# Patient Record
Sex: MALE | Race: WHITE | NOT HISPANIC OR LATINO | URBAN - METROPOLITAN AREA
[De-identification: names, ages, dates, MRNs, and addresses within clinical notes are randomized per-mention and may not be internally consistent; named-entity substitution may affect disease eponyms.]

---

## 2020-06-22 ENCOUNTER — INPATIENT (INPATIENT)
Facility: HOSPITAL | Age: 65
LOS: 7 days | Discharge: HOME HEALTH | DRG: 329 | End: 2020-06-30
Attending: HOSPITALIST | Admitting: HOSPITALIST
Payer: COMMERCIAL

## 2020-06-22 VITALS — HEIGHT: 70 IN | WEIGHT: 199.96 LBS

## 2020-06-22 DIAGNOSIS — R55 SYNCOPE AND COLLAPSE: ICD-10-CM

## 2020-06-22 LAB
ALBUMIN SERPL ELPH-MCNC: 3.2 G/DL — LOW (ref 3.3–5)
ALP SERPL-CCNC: 89 U/L — SIGNIFICANT CHANGE UP (ref 40–120)
ALT FLD-CCNC: 44 U/L — SIGNIFICANT CHANGE UP (ref 12–78)
ANION GAP SERPL CALC-SCNC: 3 MMOL/L — LOW (ref 5–17)
APPEARANCE UR: CLEAR — SIGNIFICANT CHANGE UP
APTT BLD: 24.5 SEC — LOW (ref 27.5–36.3)
AST SERPL-CCNC: 87 U/L — HIGH (ref 15–37)
BASOPHILS # BLD AUTO: 0 K/UL — SIGNIFICANT CHANGE UP (ref 0–0.2)
BASOPHILS NFR BLD AUTO: 0 % — SIGNIFICANT CHANGE UP (ref 0–2)
BILIRUB SERPL-MCNC: 0.4 MG/DL — SIGNIFICANT CHANGE UP (ref 0.2–1.2)
BILIRUB UR-MCNC: NEGATIVE — SIGNIFICANT CHANGE UP
BUN SERPL-MCNC: 15 MG/DL — SIGNIFICANT CHANGE UP (ref 7–23)
CALCIUM SERPL-MCNC: 9.1 MG/DL — SIGNIFICANT CHANGE UP (ref 8.5–10.1)
CHLORIDE SERPL-SCNC: 111 MMOL/L — HIGH (ref 96–108)
CO2 SERPL-SCNC: 26 MMOL/L — SIGNIFICANT CHANGE UP (ref 22–31)
COLOR SPEC: YELLOW — SIGNIFICANT CHANGE UP
CREAT SERPL-MCNC: 0.87 MG/DL — SIGNIFICANT CHANGE UP (ref 0.5–1.3)
DIFF PNL FLD: NEGATIVE — SIGNIFICANT CHANGE UP
EOSINOPHIL # BLD AUTO: 0 K/UL — SIGNIFICANT CHANGE UP (ref 0–0.5)
EOSINOPHIL NFR BLD AUTO: 0 % — SIGNIFICANT CHANGE UP (ref 0–6)
GLUCOSE SERPL-MCNC: 114 MG/DL — HIGH (ref 70–99)
GLUCOSE UR QL: NEGATIVE MG/DL — SIGNIFICANT CHANGE UP
HCT VFR BLD CALC: 26.4 % — LOW (ref 39–50)
HGB BLD-MCNC: 6.9 G/DL — CRITICAL LOW (ref 13–17)
INR BLD: 1.15 RATIO — SIGNIFICANT CHANGE UP (ref 0.88–1.16)
KETONES UR-MCNC: NEGATIVE — SIGNIFICANT CHANGE UP
LEUKOCYTE ESTERASE UR-ACNC: NEGATIVE — SIGNIFICANT CHANGE UP
LYMPHOCYTES # BLD AUTO: 0.85 K/UL — LOW (ref 1–3.3)
LYMPHOCYTES # BLD AUTO: 13 % — SIGNIFICANT CHANGE UP (ref 13–44)
MCHC RBC-ENTMCNC: 16.3 PG — LOW (ref 27–34)
MCHC RBC-ENTMCNC: 26.1 GM/DL — LOW (ref 32–36)
MCV RBC AUTO: 62.4 FL — LOW (ref 80–100)
MONOCYTES # BLD AUTO: 0.39 K/UL — SIGNIFICANT CHANGE UP (ref 0–0.9)
MONOCYTES NFR BLD AUTO: 6 % — SIGNIFICANT CHANGE UP (ref 2–14)
NEUTROPHILS # BLD AUTO: 5.27 K/UL — SIGNIFICANT CHANGE UP (ref 1.8–7.4)
NEUTROPHILS NFR BLD AUTO: 81 % — HIGH (ref 43–77)
NITRITE UR-MCNC: NEGATIVE — SIGNIFICANT CHANGE UP
NRBC # BLD: SIGNIFICANT CHANGE UP /100 WBCS (ref 0–0)
PH UR: 5 — SIGNIFICANT CHANGE UP (ref 5–8)
PLATELET # BLD AUTO: 371 K/UL — SIGNIFICANT CHANGE UP (ref 150–400)
POTASSIUM SERPL-MCNC: 4.1 MMOL/L — SIGNIFICANT CHANGE UP (ref 3.5–5.3)
POTASSIUM SERPL-SCNC: 4.1 MMOL/L — SIGNIFICANT CHANGE UP (ref 3.5–5.3)
PROT SERPL-MCNC: 7.5 GM/DL — SIGNIFICANT CHANGE UP (ref 6–8.3)
PROT UR-MCNC: NEGATIVE MG/DL — SIGNIFICANT CHANGE UP
PROTHROM AB SERPL-ACNC: 12.8 SEC — SIGNIFICANT CHANGE UP (ref 10–12.9)
RBC # BLD: 4.23 M/UL — SIGNIFICANT CHANGE UP (ref 4.2–5.8)
RBC # FLD: 22.1 % — HIGH (ref 10.3–14.5)
SODIUM SERPL-SCNC: 140 MMOL/L — SIGNIFICANT CHANGE UP (ref 135–145)
SP GR SPEC: 1.01 — SIGNIFICANT CHANGE UP (ref 1.01–1.02)
UROBILINOGEN FLD QL: NEGATIVE MG/DL — SIGNIFICANT CHANGE UP
WBC # BLD: 6.5 K/UL — SIGNIFICANT CHANGE UP (ref 3.8–10.5)
WBC # FLD AUTO: 6.5 K/UL — SIGNIFICANT CHANGE UP (ref 3.8–10.5)

## 2020-06-22 PROCEDURE — 97162 PT EVAL MOD COMPLEX 30 MIN: CPT | Mod: GP

## 2020-06-22 PROCEDURE — 88305 TISSUE EXAM BY PATHOLOGIST: CPT

## 2020-06-22 PROCEDURE — 85730 THROMBOPLASTIN TIME PARTIAL: CPT

## 2020-06-22 PROCEDURE — 88342 IMHCHEM/IMCYTCHM 1ST ANTB: CPT

## 2020-06-22 PROCEDURE — 83735 ASSAY OF MAGNESIUM: CPT

## 2020-06-22 PROCEDURE — 85018 HEMOGLOBIN: CPT

## 2020-06-22 PROCEDURE — P9016: CPT

## 2020-06-22 PROCEDURE — 97116 GAIT TRAINING THERAPY: CPT | Mod: GP

## 2020-06-22 PROCEDURE — 86769 SARS-COV-2 COVID-19 ANTIBODY: CPT

## 2020-06-22 PROCEDURE — 80053 COMPREHEN METABOLIC PANEL: CPT

## 2020-06-22 PROCEDURE — 45380 COLONOSCOPY AND BIOPSY: CPT

## 2020-06-22 PROCEDURE — 81003 URINALYSIS AUTO W/O SCOPE: CPT

## 2020-06-22 PROCEDURE — 70450 CT HEAD/BRAIN W/O DYE: CPT | Mod: 26

## 2020-06-22 PROCEDURE — 88341 IMHCHEM/IMCYTCHM EA ADD ANTB: CPT

## 2020-06-22 PROCEDURE — 85025 COMPLETE CBC W/AUTO DIFF WBC: CPT

## 2020-06-22 PROCEDURE — 85027 COMPLETE CBC AUTOMATED: CPT

## 2020-06-22 PROCEDURE — 88309 TISSUE EXAM BY PATHOLOGIST: CPT

## 2020-06-22 PROCEDURE — 74177 CT ABD & PELVIS W/CONTRAST: CPT

## 2020-06-22 PROCEDURE — 85014 HEMATOCRIT: CPT

## 2020-06-22 PROCEDURE — 85610 PROTHROMBIN TIME: CPT

## 2020-06-22 PROCEDURE — 82962 GLUCOSE BLOOD TEST: CPT

## 2020-06-22 PROCEDURE — 36430 TRANSFUSION BLD/BLD COMPNT: CPT

## 2020-06-22 PROCEDURE — 36415 COLL VENOUS BLD VENIPUNCTURE: CPT

## 2020-06-22 PROCEDURE — 71045 X-RAY EXAM CHEST 1 VIEW: CPT | Mod: 26

## 2020-06-22 PROCEDURE — 99223 1ST HOSP IP/OBS HIGH 75: CPT

## 2020-06-22 PROCEDURE — 80048 BASIC METABOLIC PNL TOTAL CA: CPT

## 2020-06-22 PROCEDURE — 73552 X-RAY EXAM OF FEMUR 2/>: CPT | Mod: 26,LT

## 2020-06-22 PROCEDURE — 84100 ASSAY OF PHOSPHORUS: CPT

## 2020-06-22 PROCEDURE — 73502 X-RAY EXAM HIP UNI 2-3 VIEWS: CPT | Mod: 26,LT

## 2020-06-22 PROCEDURE — 86803 HEPATITIS C AB TEST: CPT

## 2020-06-22 PROCEDURE — 93306 TTE W/DOPPLER COMPLETE: CPT

## 2020-06-22 PROCEDURE — 74177 CT ABD & PELVIS W/CONTRAST: CPT | Mod: 26

## 2020-06-22 PROCEDURE — C9113: CPT

## 2020-06-22 RX ORDER — PANTOPRAZOLE SODIUM 20 MG/1
40 TABLET, DELAYED RELEASE ORAL ONCE
Refills: 0 | Status: COMPLETED | OUTPATIENT
Start: 2020-06-22 | End: 2020-06-22

## 2020-06-22 RX ORDER — ONDANSETRON 8 MG/1
4 TABLET, FILM COATED ORAL EVERY 6 HOURS
Refills: 0 | Status: DISCONTINUED | OUTPATIENT
Start: 2020-06-22 | End: 2020-06-25

## 2020-06-22 RX ORDER — PANTOPRAZOLE SODIUM 20 MG/1
40 TABLET, DELAYED RELEASE ORAL
Refills: 0 | Status: DISCONTINUED | OUTPATIENT
Start: 2020-06-22 | End: 2020-06-25

## 2020-06-22 RX ORDER — SODIUM CHLORIDE 9 MG/ML
500 INJECTION INTRAMUSCULAR; INTRAVENOUS; SUBCUTANEOUS ONCE
Refills: 0 | Status: COMPLETED | OUTPATIENT
Start: 2020-06-22 | End: 2020-06-22

## 2020-06-22 RX ORDER — ACETAMINOPHEN 500 MG
650 TABLET ORAL ONCE
Refills: 0 | Status: DISCONTINUED | OUTPATIENT
Start: 2020-06-22 | End: 2020-06-25

## 2020-06-22 RX ADMIN — PANTOPRAZOLE SODIUM 40 MILLIGRAM(S): 20 TABLET, DELAYED RELEASE ORAL at 14:34

## 2020-06-22 RX ADMIN — SODIUM CHLORIDE 500 MILLILITER(S): 9 INJECTION INTRAMUSCULAR; INTRAVENOUS; SUBCUTANEOUS at 12:04

## 2020-06-22 RX ADMIN — PANTOPRAZOLE SODIUM 40 MILLIGRAM(S): 20 TABLET, DELAYED RELEASE ORAL at 21:27
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
## 2020-06-22 NOTE — ED ADULT NURSE NOTE - NSIMPLEMENTINTERV_GEN_ALL_ED
Implemented All Fall Risk Interventions:  [place] to call system. Call bell, personal items and telephone within reach. Instruct patient to call for assistance. Room bathroom lighting operational. Non-slip footwear when patient is off stretcher. Physically safe environment: no spills, clutter or unnecessary equipment. Stretcher in lowest position, wheels locked, appropriate side rails in place. Provide visual cue, wrist band, yellow gown, etc. Monitor gait and stability. Monitor for mental status changes and reorient to person, place, and time. Review medications for side effects contributing to fall risk. Reinforce activity limits and safety measures with patient and family.

## 2020-06-22 NOTE — H&P ADULT - ASSESSMENT
65yo male without sig PMHx a/w anemia, of unclear etiology and syncope        Anemia  Syncope  Hip Pain    - currently afebrile, HD stable, comfortable in NAD  - as per ER rectal exam, GUIAC NEG  - pt set to receive 2u PRBC  - CTH negative, XR hip negative for fx    PLAN:  - supp o2 as needed, MDI PRN  - NO ID issues  - transfuse 2u PRBC  - Clear liquid diet, NPO after MN for possible EGD  - GI eval  - Cardio eval  - PPI IV BID  - ECHO  - DVT PPx, SCDs  - Admit, tele

## 2020-06-22 NOTE — H&P ADULT - NSHPPHYSICALEXAM_GEN_ALL_CORE
T(C): 37 (06-22-20 @ 14:31), Max: 37.2 (06-22-20 @ 10:48)  HR: 66 (06-22-20 @ 14:31) (66 - 78)  BP: 145/66 (06-22-20 @ 14:31) (120/64 - 145/69)  RR: 14 (06-22-20 @ 14:31) (12 - 18)  SpO2: 100% (06-22-20 @ 14:31) (100% - 100%)  Wt(kg): --      Gen: AAOx3, NAD  HEENT: NCAT, EOMI  Neck: Supple  CV: nml S1S2, RRR  Lungs: CTABL  Abd: Soft, NT, ND, BS+, GUIAC NEG (as per ER)  Neuro: Non focal  Skin: normal  Psych: normal affect

## 2020-06-22 NOTE — H&P ADULT - HISTORY OF PRESENT ILLNESS
Patient is 65yo male without sig PMhx, not on any prescription meds, presents to the hospital with syncopal event. As per the patient, he walked into the kitchen and "blacked out" falling onto his L hip. Pt denies preceding symptoms of CP, SOB, palpitations, HA, dizziness. No other constitutional symptoms. Of note patient last MD 3y ago, and was told everything was OK. Pt endorses an intentional 20lb weight loss in last 2 months. Denies melena, BRBPR, abd pain, N/V/D.   In the ER HH 6.9, 2u prbc ordered, unclear baseline.
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
## 2020-06-22 NOTE — CONSULT NOTE ADULT - SUBJECTIVE AND OBJECTIVE BOX
Patient is a 64y old  Male who presents with a chief complaint of Anemia, Syncope (22 Jun 2020 14:36)      HPI:  Patient is 63yo male without sig PMhx, not on any prescription meds, presents to the hospital with syncopal event. As per the patient, he walked into the kitchen and "blacked out" falling onto his L hip. Pt denies preceding symptoms of CP, SOB, palpitations, HA, dizziness. No other constitutional symptoms. Of note patient last MD 3y ago, and was told everything was OK. Pt endorses an intentional 20lb weight loss in last 2 months. Denies melena, BRBPR, abd pain, N/V/D.   In the ER HH 6.9, 2u prbc ordered, unclear baseline. (22 Jun 2020 14:36)  He denies any melena or rectal bleeding  Occasional mild rlq pain but self-limited without clear exacerbating or mitigating factors    PAST MEDICAL & SURGICAL HISTORY:    MEDICATIONS  (STANDING):  pantoprazole  Injectable 40 milliGRAM(s) IV Push two times a day    MEDICATIONS  (PRN):  acetaminophen   Tablet .. 650 milliGRAM(s) Oral once PRN Temp greater or equal to 38C (100.4F), Mild Pain (1 - 3)  ondansetron Injectable 4 milliGRAM(s) IV Push every 6 hours PRN Nausea    Allergies  No Known Allergies    SOCIAL HISTORY: occ etoh,     FAMILY HISTORY: no gi malignancy      REVIEW OF SYSTEMS:  CONSTITUTIONAL: as above  EYES/ENT: No visual changes;  No vertigo or throat pain   NECK: No pain or stiffness  RESPIRATORY: No cough, wheezing, hemoptysis; No shortness of breath  CARDIOVASCULAR: No chest pain or palpitations  GASTROINTESTINAL: as abvoe  GENITOURINARY: No dysuria, frequency or hematuria  NEUROLOGICAL: No numbness or weakness  SKIN: No itching, burning, rashes, or lesions   PSYCH: Normal mood and affect  All other review of systems is negative unless indicated above.    Vital Signs Last 24 Hrs  T(C): 37 (22 Jun 2020 14:46), Max: 37.2 (22 Jun 2020 10:48)  T(F): 98.6 (22 Jun 2020 14:46), Max: 98.9 (22 Jun 2020 10:48)  HR: 68 (22 Jun 2020 14:46) (66 - 78)  BP: 126/67 (22 Jun 2020 14:46) (120/64 - 145/69)  BP(mean): --  RR: 16 (22 Jun 2020 14:46) (12 - 18)  SpO2: 100% (22 Jun 2020 14:46) (100% - 100%)    PHYSICAL EXAM:    Constitutional: NAD, well-developed  HEENT: MMM  Neck: No LAD  Respiratory: CTA  Cardiovascular: S1 and S2  Gastrointestinal: BS+, soft, NT/ND  Extremities: No peripheral edema  Vascular: 2+ peripheral pulses  Neurological: A/O x 3, no focal deficits  Psychiatric: Normal mood, normal affect  Skin: No rashes    LABS:                        6.9    6.50  )-----------( 371      ( 22 Jun 2020 11:34 )             26.4     06-22    140  |  111<H>  |  15  ----------------------------<  114<H>  4.1   |  26  |  0.87    Ca    9.1      22 Jun 2020 11:34    TPro  7.5  /  Alb  3.2<L>  /  TBili  0.4  /  DBili  x   /  AST  87<H>  /  ALT  44  /  AlkPhos  89  06-22    PT/INR - ( 22 Jun 2020 11:34 )   PT: 12.8 sec;   INR: 1.15 ratio         PTT - ( 22 Jun 2020 11:34 )  PTT:24.5 sec  LIVER FUNCTIONS - ( 22 Jun 2020 11:34 )  Alb: 3.2 g/dL / Pro: 7.5 gm/dL / ALK PHOS: 89 U/L / ALT: 44 U/L / AST: 87 U/L / GGT: x             RADIOLOGY & ADDITIONAL STUDIES:

## 2020-06-22 NOTE — CHART NOTE - NSCHARTNOTEFT_GEN_A_CORE
Received call from radiologist. Pt found to have large mass in ascending colon, possible hepatic mets. Discussed results with patient and also with wife at patient's request. All questions answered. Pt aware of likely malignancy. GI Dr. Houser updated, plan for possible colonoscopy on Wednesday after cleared by cardio.

## 2020-06-22 NOTE — ED STATDOCS - CLINICAL SUMMARY MEDICAL DECISION MAKING FREE TEXT BOX
63 y/o male with left leg and hip pain as well as syncope x2. Concern for cardiac etiology vs seizure. Will obtain CT, labs, EKG, fluids, cardiac enzymes, reassess.

## 2020-06-22 NOTE — CONSULT NOTE ADULT - ASSESSMENT
65yo male with new anemia, hemoccult negative with syncope  cardiology to see  he will need endoscopic evaluation - possible egd tomorrow   check ct scan given new anemia without clear source and intermittent right sided abd pain  serial h/h and transfuse as needed

## 2020-06-22 NOTE — H&P ADULT - NSHPLABSRESULTS_GEN_ALL_CORE
CARDIAC MARKERS ( 22 Jun 2020 11:34 )  <0.015 ng/mL / x     / x     / x     / x                                6.9    6.50  )-----------( 371      ( 22 Jun 2020 11:34 )             26.4     22 Jun 2020 11:34    140    |  111    |  15     ----------------------------<  114    4.1     |  26     |  0.87     Ca    9.1        22 Jun 2020 11:34    TPro  7.5    /  Alb  3.2    /  TBili  0.4    /  DBili  x      /  AST  87     /  ALT  44     /  AlkPhos  89     22 Jun 2020 11:34    PT/INR - ( 22 Jun 2020 11:34 )   PT: 12.8 sec;   INR: 1.15 ratio         PTT - ( 22 Jun 2020 11:34 )  PTT:24.5 sec  CAPILLARY BLOOD GLUCOSE        LIVER FUNCTIONS - ( 22 Jun 2020 11:34 )  Alb: 3.2 g/dL / Pro: 7.5 gm/dL / ALK PHOS: 89 U/L / ALT: 44 U/L / AST: 87 U/L / GGT: x

## 2020-06-22 NOTE — ED STATDOCS - NEUROLOGICAL, MLM
5/5 strength. Normal sensation. Cranial nerves 2-12 intact. No dysmetria. Pt slightly shaking during finger to nose.

## 2020-06-22 NOTE — H&P ADULT - NSHPREVIEWOFSYSTEMS_GEN_ALL_CORE
Completed a split night PSG per provider order.    Preliminary AHI >10.  A final therapeutic PAP pressure was not achieved.    Supine REM was not seen on therapeutic pressure.    Patient reports feeling not refreshed in AM.   (-)Fever, chills, cough, chest pain, sob, headache, dizziness, palpitations, abd pain, n/v/d, leg swelling  (+)Syncope

## 2020-06-22 NOTE — ED ADULT NURSE REASSESSMENT NOTE - NS ED NURSE REASSESS COMMENT FT1
Patient received from previous nurse. Pt is A&Ox4, VSS on RA. Pt is resting comfortably in their bed at this time, denies any pain or discomfort at this time. Blood transfusion infusing without any complications. Safety and comfort measures in place. Hourly rounding will be done on my time. Will continue to monitor.

## 2020-06-22 NOTE — ED ADULT TRIAGE NOTE - CHIEF COMPLAINT QUOTE
Patient comes in after syncopal episode last night. +LOC. denies headstrike. Patient also states he has L leg pain. patient denies sob/cp.

## 2020-06-22 NOTE — ED STATDOCS - ATTENDING CONTRIBUTION TO CARE
I, Betsey Snell MD, personally saw the patient with ACP.  I have personally performed a face to face diagnostic evaluation on this patient.  I have reviewed the ACP note and agree with the history, exam, and plan of care, except as noted.

## 2020-06-23 LAB
ALBUMIN SERPL ELPH-MCNC: 2.7 G/DL — LOW (ref 3.3–5)
ALP SERPL-CCNC: 79 U/L — SIGNIFICANT CHANGE UP (ref 40–120)
ALT FLD-CCNC: 34 U/L — SIGNIFICANT CHANGE UP (ref 12–78)
ANION GAP SERPL CALC-SCNC: 5 MMOL/L — SIGNIFICANT CHANGE UP (ref 5–17)
AST SERPL-CCNC: 49 U/L — HIGH (ref 15–37)
BILIRUB SERPL-MCNC: 0.6 MG/DL — SIGNIFICANT CHANGE UP (ref 0.2–1.2)
BUN SERPL-MCNC: 8 MG/DL — SIGNIFICANT CHANGE UP (ref 7–23)
CALCIUM SERPL-MCNC: 8.9 MG/DL — SIGNIFICANT CHANGE UP (ref 8.5–10.1)
CHLORIDE SERPL-SCNC: 109 MMOL/L — HIGH (ref 96–108)
CO2 SERPL-SCNC: 26 MMOL/L — SIGNIFICANT CHANGE UP (ref 22–31)
CREAT SERPL-MCNC: 0.74 MG/DL — SIGNIFICANT CHANGE UP (ref 0.5–1.3)
GLUCOSE SERPL-MCNC: 88 MG/DL — SIGNIFICANT CHANGE UP (ref 70–99)
HCT VFR BLD CALC: 27.9 % — LOW (ref 39–50)
HCT VFR BLD CALC: 29.7 % — LOW (ref 39–50)
HCT VFR BLD CALC: 31.2 % — LOW (ref 39–50)
HCV AB S/CO SERPL IA: 0.14 S/CO — SIGNIFICANT CHANGE UP (ref 0–0.99)
HCV AB SERPL-IMP: SIGNIFICANT CHANGE UP
HGB BLD-MCNC: 7.8 G/DL — LOW (ref 13–17)
HGB BLD-MCNC: 8.3 G/DL — LOW (ref 13–17)
HGB BLD-MCNC: 8.8 G/DL — LOW (ref 13–17)
MCHC RBC-ENTMCNC: 18.1 PG — LOW (ref 27–34)
MCHC RBC-ENTMCNC: 18.2 PG — LOW (ref 27–34)
MCHC RBC-ENTMCNC: 27.9 GM/DL — LOW (ref 32–36)
MCHC RBC-ENTMCNC: 28 GM/DL — LOW (ref 32–36)
MCV RBC AUTO: 64.9 FL — LOW (ref 80–100)
MCV RBC AUTO: 65.1 FL — LOW (ref 80–100)
PLATELET # BLD AUTO: 309 K/UL — SIGNIFICANT CHANGE UP (ref 150–400)
PLATELET # BLD AUTO: 315 K/UL — SIGNIFICANT CHANGE UP (ref 150–400)
POTASSIUM SERPL-MCNC: 3.8 MMOL/L — SIGNIFICANT CHANGE UP (ref 3.5–5.3)
POTASSIUM SERPL-SCNC: 3.8 MMOL/L — SIGNIFICANT CHANGE UP (ref 3.5–5.3)
PROT SERPL-MCNC: 6.6 GM/DL — SIGNIFICANT CHANGE UP (ref 6–8.3)
RBC # BLD: 4.3 M/UL — SIGNIFICANT CHANGE UP (ref 4.2–5.8)
RBC # BLD: 4.56 M/UL — SIGNIFICANT CHANGE UP (ref 4.2–5.8)
RBC # FLD: 23.5 % — HIGH (ref 10.3–14.5)
RBC # FLD: 23.8 % — HIGH (ref 10.3–14.5)
SARS-COV-2 RNA SPEC QL NAA+PROBE: SIGNIFICANT CHANGE UP
SODIUM SERPL-SCNC: 140 MMOL/L — SIGNIFICANT CHANGE UP (ref 135–145)
WBC # BLD: 5.7 K/UL — SIGNIFICANT CHANGE UP (ref 3.8–10.5)
WBC # BLD: 6.8 K/UL — SIGNIFICANT CHANGE UP (ref 3.8–10.5)
WBC # FLD AUTO: 5.7 K/UL — SIGNIFICANT CHANGE UP (ref 3.8–10.5)
WBC # FLD AUTO: 6.8 K/UL — SIGNIFICANT CHANGE UP (ref 3.8–10.5)

## 2020-06-23 PROCEDURE — 99233 SBSQ HOSP IP/OBS HIGH 50: CPT | Mod: GC

## 2020-06-23 PROCEDURE — 93306 TTE W/DOPPLER COMPLETE: CPT | Mod: 26

## 2020-06-23 PROCEDURE — 99232 SBSQ HOSP IP/OBS MODERATE 35: CPT

## 2020-06-23 RX ORDER — BACLOFEN 100 %
5 POWDER (GRAM) MISCELLANEOUS ONCE
Refills: 0 | Status: DISCONTINUED | OUTPATIENT
Start: 2020-06-23 | End: 2020-06-25

## 2020-06-23 RX ORDER — SOD SULF/SODIUM/NAHCO3/KCL/PEG
4000 SOLUTION, RECONSTITUTED, ORAL ORAL ONCE
Refills: 0 | Status: COMPLETED | OUTPATIENT
Start: 2020-06-23 | End: 2020-06-23

## 2020-06-23 RX ORDER — LIDOCAINE 4 G/100G
1 CREAM TOPICAL DAILY
Refills: 0 | Status: DISCONTINUED | OUTPATIENT
Start: 2020-06-23 | End: 2020-06-25

## 2020-06-23 RX ORDER — ACETAMINOPHEN 500 MG
650 TABLET ORAL EVERY 6 HOURS
Refills: 0 | Status: DISCONTINUED | OUTPATIENT
Start: 2020-06-23 | End: 2020-06-25

## 2020-06-23 RX ADMIN — LIDOCAINE 1 PATCH: 4 CREAM TOPICAL at 19:30

## 2020-06-23 RX ADMIN — PANTOPRAZOLE SODIUM 40 MILLIGRAM(S): 20 TABLET, DELAYED RELEASE ORAL at 10:13

## 2020-06-23 RX ADMIN — PANTOPRAZOLE SODIUM 40 MILLIGRAM(S): 20 TABLET, DELAYED RELEASE ORAL at 21:31

## 2020-06-23 RX ADMIN — LIDOCAINE 1 PATCH: 4 CREAM TOPICAL at 18:51

## 2020-06-23 RX ADMIN — Medication 4000 MILLILITER(S): at 14:02

## 2020-06-23 NOTE — PROGRESS NOTE ADULT - SUBJECTIVE AND OBJECTIVE BOX
HPI:  Patient is 65yo male without sig PMhx, not on any prescription meds, presents to the hospital with syncopal event. As per the patient, he walked into the kitchen and "blacked out" falling onto his L hip. Pt denies preceding symptoms of CP, SOB, palpitations, HA, dizziness. No other constitutional symptoms. Of note patient last MD 3y ago, and was told everything was OK. Pt endorses an intentional 20lb weight loss in last 2 months. Denies melena, BRBPR, abd pain, N/V/D.   In the ER HH 6.9, 2u prbc ordered, unclear baseline. (2020 14:36)    SUBJECTIVE :   Pt is evaluated at bedside and found NAD and in no acute distress. He reports last  was standing at the kitchen when collapsed. He denies any dizziness, headache, SOB, chest pain and palpitations prior to the event. He reports a similar episode at memorial day but never went to the doctor for evaluation. He endorse 10 pound loss in the last months due to decreased appetite. Today he complaint of left hip pain with ambulation since had the fall. He describes pain start  at the hip area and travel to the anterior thigh and knee. He  is unable to ambulate appropriately due to pain.  He denies hematuria, hematochezia,  melena, abdominal pain, nausea, vomits ot any other symptoms.    MEDICATIONS  (STANDING):  lidocaine   Patch 1 Patch Transdermal daily  pantoprazole  Injectable 40 milliGRAM(s) IV Push two times a day    MEDICATIONS  (PRN):  acetaminophen   Tablet .. 650 milliGRAM(s) Oral once PRN Temp greater or equal to 38C (100.4F), Mild Pain (1 - 3)  acetaminophen   Tablet .. 650 milliGRAM(s) Oral every 6 hours PRN Temp greater or equal to 38C (100.4F), Mild Pain (1 - 3)  baclofen 5 milliGRAM(s) Oral once PRN hip pain  ondansetron Injectable 4 milliGRAM(s) IV Push every 6 hours PRN Nausea      Vital Signs Last 24 Hrs  T(C): 37.1 (2020 09:34), Max: 37.2 (2020 18:25)  T(F): 98.7 (2020 09:34), Max: 99 (2020 18:25)  HR: 74 (2020 09:34) (69 - 79)  BP: 150/73 (2020 09:34) (119/95 - 153/85)  BP(mean): 83 (2020 15:20) (83 - 83)  RR: 18 (2020 09:34) (16 - 18)  SpO2: 100% (2020 09:34) (98% - 100%)    GEN: NAD, comfortable, resting in bed  HEENT: NC/AT, EOMI, PERRLA, MMM, clear conjunctiva and sclera, normal hearing, no nasal discharge, throat clear, no thrush, normal dentition.   NECK: supple, no JVD, no LAD, no thyromegaly  CV: S1S2, RRR, no murmur  RESP: good air movement, CTABL, no rales, rhonchi or wheezing, respirations unlabored  ABD: +BS, soft, ND, NT, no guarding, no rigidity, no HSM  EXT: no edema, no calve tenderness  SKIN: No visible Rashes or Ulcers  MSK: Pain to internal and external rotation of the left hip. Pt limbs at ambulation due to pain   NEURO:  sensory and CN 2-12 Grossly intact, no motor deficits,  AOx3  PSYCH: normal behavior         LABS:                          8.3    5.70  )-----------( 315      ( 2020 07:26 )             29.7     2020 07:26    140    |  109    |  8      ----------------------------<  88     3.8     |  26     |  0.74     Ca    8.9        2020 07:26    TPro  6.6    /  Alb  2.7    /  TBili  0.6    /  DBili  x      /  AST  49     /  ALT  34     /  AlkPhos  79     2020 07:26    LIVER FUNCTIONS - ( 2020 07:26 )  Alb: 2.7 g/dL / Pro: 6.6 gm/dL / ALK PHOS: 79 U/L / ALT: 34 U/L / AST: 49 U/L / GGT: x           PT/INR - ( 2020 11:34 )   PT: 12.8 sec;   INR: 1.15 ratio         PTT - ( 2020 11:34 )  PTT:24.5 sec  CAPILLARY BLOOD GLUCOSE        CARDIAC MARKERS ( 2020 11:34 )  <0.015 ng/mL / x     / x     / x     / x          Urinalysis Basic - ( 2020 17:03 )    Color: Yellow / Appearance: Clear / S.015 / pH: x  Gluc: x / Ketone: Negative  / Bili: Negative / Urobili: Negative mg/dL   Blood: x / Protein: Negative mg/dL / Nitrite: Negative   Leuk Esterase: Negative / RBC: x / WBC x   Sq Epi: x / Non Sq Epi: x / Bacteria: x        RADIOLOGY:    CT Abdomen and Pelvis w/ Oral Cont and w/ IV Cont (20 @ 17:32) >  FINDINGS:  LOWER CHEST: Within normal limits.    LIVER: Single nonspecific rounded hypodensity in the right hepatic lobe at the dome measuring 1.2 cm.  BILE DUCTS: Normal caliber.  GALLBLADDER: Within normal limits.  SPLEEN: Within normal limits.  PANCREAS: Within normal limits.  ADRENALS: Within normal limits.  KIDNEYS/URETERS: Within normal limits.    BLADDER: Within normal limits.  REPRODUCTIVE ORGANS: Prostate within normal limits.    BOWEL: No bowel obstruction. There is a soft tissue mass in the proximal ascending colon justabove the level of the ileocecal valve. This measures 6.2 x 3.5 x 6.0 cm AP and is suspicious for colon carcinoma. Direct visualization is recommended.  PERITONEUM: No ascites.  VESSELS: Within normal limits.  RETROPERITONEUM/LYMPH NODES: No lymphadenopathy.    ABDOMINAL WALL: Midline fat-containing ventral hernia.  BONES: Compression screws are seen in the left hip. There are mild degenerative changes of the spine with mild dextroscoliosis    IMPRESSION:   Large mass ascending colon suspicious for colon carcinoma.  Small hepatic region indeterminate but may represent metastatic disease given the colon mass.  Findings were discussed with the hospitalist Dr. Beltran at 6:10 PM on 2020. HPI:  Patient is 63yo male without sig PMhx, not on any prescription meds, presents to the hospital with syncopal event. As per the patient, he walked into the kitchen and "blacked out" falling onto his L hip. Pt denies preceding symptoms of CP, SOB, palpitations, HA, dizziness. No other constitutional symptoms. Of note patient last MD 3y ago, and was told everything was OK. Pt endorses an intentional 20lb weight loss in last 2 months. Denies melena, BRBPR, abd pain, N/V/D.   In the ER HH 6.9, 2u prbc ordered, unclear baseline. (2020 14:36)    SUBJECTIVE :   Pt is evaluated at bedside and found NAD and in no acute distress. He reports last  was standing at the kitchen when collapsed. He denies any dizziness, headache, SOB, chest pain and palpitations prior to the event. He reports a similar episode at memorial day but never went to the doctor for evaluation. He endorse 10 pound loss in the last months due to decreased appetite. Today he complaint of left hip pain with ambulation since had the fall. He describes pain start  at the hip area and travel to the anterior thigh and knee. He  is unable to ambulate appropriately due to pain.  He denies hematuria, hematochezia,  melena, abdominal pain, nausea, vomits ot any other symptoms.    MEDICATIONS  (STANDING):  lidocaine   Patch 1 Patch Transdermal daily  pantoprazole  Injectable 40 milliGRAM(s) IV Push two times a day    MEDICATIONS  (PRN):  acetaminophen   Tablet .. 650 milliGRAM(s) Oral once PRN Temp greater or equal to 38C (100.4F), Mild Pain (1 - 3)  acetaminophen   Tablet .. 650 milliGRAM(s) Oral every 6 hours PRN Temp greater or equal to 38C (100.4F), Mild Pain (1 - 3)  baclofen 5 milliGRAM(s) Oral once PRN hip pain  ondansetron Injectable 4 milliGRAM(s) IV Push every 6 hours PRN Nausea      Vital Signs Last 24 Hrs  T(C): 37.1 (2020 09:34), Max: 37.2 (2020 18:25)  T(F): 98.7 (2020 09:34), Max: 99 (2020 18:25)  HR: 74 (2020 09:34) (69 - 79)  BP: 150/73 (2020 09:34) (119/95 - 153/85)  BP(mean): 83 (2020 15:20) (83 - 83)  RR: 18 (2020 09:34) (16 - 18)  SpO2: 100% (2020 09:34) (98% - 100%)    GEN: NAD, comfortable, resting in bed  HEENT: NC/AT, EOMI, PERRLA, MMM, clear conjunctiva and sclera, normal hearing, no nasal discharge, throat clear, no thrush, normal dentition.   NECK: supple, no JVD, no LAD, no thyromegaly  CV: S1S2, RRR, no murmur  RESP: good air movement, CTABL, no rales, rhonchi or wheezing, respirations unlabored  ABD: +BS, soft, ND, NT, no guarding, no rigidity, no HSM  EXT: no edema, no calve tenderness  SKIN: No visible Rashes or Ulcers  MSK: Pain to internal and external rotation of the left hip. Pt limbs at ambulation due to pain   NEURO:  sensory and CN 2-12 Grossly intact, no motor deficits,  AOx3  PSYCH: normal behavior         LABS:                          8.3    5.70  )-----------( 315      ( 2020 07:26 )             29.7     2020 07:26    140    |  109    |  8      ----------------------------<  88     3.8     |  26     |  0.74     Ca    8.9        2020 07:26    TPro  6.6    /  Alb  2.7    /  TBili  0.6    /  DBili  x      /  AST  49     /  ALT  34     /  AlkPhos  79     2020 07:26    LIVER FUNCTIONS - ( 2020 07:26 )  Alb: 2.7 g/dL / Pro: 6.6 gm/dL / ALK PHOS: 79 U/L / ALT: 34 U/L / AST: 49 U/L / GGT: x           PT/INR - ( 2020 11:34 )   PT: 12.8 sec;   INR: 1.15 ratio         PTT - ( 2020 11:34 )  PTT:24.5 sec  CAPILLARY BLOOD GLUCOSE        CARDIAC MARKERS ( 2020 11:34 )  <0.015 ng/mL / x     / x     / x     / x          Urinalysis Basic - ( 2020 17:03 )    Color: Yellow / Appearance: Clear / S.015 / pH: x  Gluc: x / Ketone: Negative  / Bili: Negative / Urobili: Negative mg/dL   Blood: x / Protein: Negative mg/dL / Nitrite: Negative   Leuk Esterase: Negative / RBC: x / WBC x   Sq Epi: x / Non Sq Epi: x / Bacteria: x        RADIOLOGY:    CT Abdomen and Pelvis w/ Oral Cont and w/ IV Cont (20 @ 17:32) >  FINDINGS:  LOWER CHEST: Within normal limits.    LIVER: Single nonspecific rounded hypodensity in the right hepatic lobe at the dome measuring 1.2 cm.  BILE DUCTS: Normal caliber.  GALLBLADDER: Within normal limits.  SPLEEN: Within normal limits.  PANCREAS: Within normal limits.  ADRENALS: Within normal limits.  KIDNEYS/URETERS: Within normal limits.    BLADDER: Within normal limits.  REPRODUCTIVE ORGANS: Prostate within normal limits.    BOWEL: No bowel obstruction. There is a soft tissue mass in the proximal ascending colon justabove the level of the ileocecal valve. This measures 6.2 x 3.5 x 6.0 cm AP and is suspicious for colon carcinoma. Direct visualization is recommended.  PERITONEUM: No ascites.  VESSELS: Within normal limits.  RETROPERITONEUM/LYMPH NODES: No lymphadenopathy.    ABDOMINAL WALL: Midline fat-containing ventral hernia.  BONES: Compression screws are seen in the left hip. There are mild degenerative changes of the spine with mild dextroscoliosis    IMPRESSION:   Large mass ascending colon suspicious for colon carcinoma.  Small hepatic region indeterminate but may represent metastatic disease given the colon mass.  Findings were discussed with the hospitalist Dr. Beltran at 6:10 PM on 2020.       Xray Hip w/ Pelvis 2 or 3 Views, Left (20 @ 12:51) >  INTERPRETATION:  Left hip with pelvis and left femur. Patient has pain after a fall.    Left hip with pelvis. 5 views.    There is hip pinning on the left with good alignment. The original fracture appears healed.    There is some chronic deformity around both sides of the pubic symphysis.    Hips appear free of degeneration.    No bone destruction or acute fracture is evident.    Left femur. 4 views.    There is no knee effusion and slight knee degeneration.    No bone destruction or fracture is evident.    IMPRESSION: Old left hip pinning with good alignment. No acute fracture.    < end of copied text >

## 2020-06-23 NOTE — CONSULT NOTE ADULT - SUBJECTIVE AND OBJECTIVE BOX
Patient is a 64y old  Male who presents with a chief complaint of Anemia, Syncope (2020 15:08)      HPI:  Patient is 65yo male without sig PMhx, not on any prescription meds, presents to the hospital with syncopal event. As per the patient, he walked into the kitchen and "blacked out" falling onto his L hip. Pt denies preceding symptoms of CP, SOB, palpitations, HA, dizziness. No other constitutional symptoms. Of note patient last MD 3y ago, and was told everything was OK. Pt endorses an intentional 20lb weight loss in last 2 months. Denies melena, BRBPR, abd pain, N/V/D.   In the ER HH 6.9, 2u prbc ordered, unclear baseline. (2020 14:36)      PAST MEDICAL & SURGICAL HISTORY:  No pertinent past medical history      CARDIAC WORKUP:      Echo:  20 Nml EF, trace MR      ALLERGIES:    No Known Allergies       MEDICATIONS  (STANDING):  pantoprazole  Injectable 40 milliGRAM(s) IV Push two times a day    MEDICATIONS  (PRN):  acetaminophen   Tablet .. 650 milliGRAM(s) Oral once PRN Temp greater or equal to 38C (100.4F), Mild Pain (1 - 3)  ondansetron Injectable 4 milliGRAM(s) IV Push every 6 hours PRN Nausea      FAMILY HISTORY:        SOCIAL HISTORY:  .scl     ROS:     .ros    Vital Signs Last 24 Hrs  T(C): 37 (2020 22:05), Max: 37.2 (2020 10:48)  T(F): 98.6 (2020 22:05), Max: 99 (2020 18:25)  HR: 72 (2020 22:05) (66 - 79)  BP: 142/71 (2020 22:05) (119/95 - 153/85)  BP(mean): 83 (2020 15:20) (83 - 83)  RR: 18 (2020 22:05) (12 - 18)  SpO2: 98% (2020 22:05) (98% - 100%)    I&O's Summary    2020 07:01  -  2020 07:00  --------------------------------------------------------  IN: 323 mL / OUT: 0 mL / NET: 323 mL        PHYSICAL EXAM:    .phy      TELEMETRY:    ECG:    LABS:                          8.3    5.70  )-----------( 315      ( 2020 07:26 )             29.7         140  |  111<H>  |  15  ----------------------------<  114<H>  4.1   |  26  |  0.87    Ca    9.1      2020 11:34    TPro  7.5  /  Alb  3.2<L>  /  TBili  0.4  /  DBili  x   /  AST  87<H>  /  ALT  44  /  AlkPhos  89   @ 11:34  Trop-I  <0.015      PT/INR - ( 2020 11:34 )   PT: 12.8 sec;   INR: 1.15 ratio    PTT - ( 2020 11:34 )  PTT:24.5 sec    Urinalysis Basic - ( 2020 17:03 )    Color: Yellow / Appearance: Clear / S.015 / pH: x  Gluc: x / Ketone: Negative  / Bili: Negative / Urobili: Negative mg/dL   Blood: x / Protein: Negative mg/dL / Nitrite: Negative   Leuk Esterase: Negative / RBC: x / WBC x   Sq Epi: x / Non Sq Epi: x / Bacteria: x      RADIOLOGY & ADDITIONAL STUDIES:    CT Abdomen and Pelvis w/ Oral Cont and w/ IV Cont (20 @ 17:32) >  IMPRESSION:   Large mass ascending colon suspicious for colon carcinoma.  Small hepatic region indeterminate but may represent metastatic disease given the colon mass.    Xray Chest 1 View AP/PA. (20 @ 12:19) >  IMPRESSION: No active pulmonary disease.    CT Head No Cont (20 @ 11:48) >  IMPRESSION:    1)  mild involutional changes and volume loss. No acute abnormality suggested.  2)  clear sinuses and mastoids.. Patient is a 64y old  Male who presents with a chief complaint of Anemia, Syncope (2020 15:08)      HPI:  Patient is 63yo male without sig PMhx, not on any prescription meds, presents to the hospital with syncopal event. As per the patient, he walked into the kitchen and "blacked out" falling onto his L hip. Pt denies preceding symptoms of CP, SOB, palpitations, HA, dizziness. No other constitutional symptoms. Of note patient last MD 3y ago, and was told everything was OK. Pt endorses an intentional 20lb weight loss in last 2 months. Denies melena, BRBPR, abd pain, N/V/D.   In the ER HH 6.9, 2u prbc ordered, unclear baseline. (2020 14:36)      PAST MEDICAL & SURGICAL HISTORY:  No pertinent past medical history      CARDIAC WORKUP:      Echo:  20 Nml EF, trace MR      ALLERGIES:    No Known Allergies       MEDICATIONS  (STANDING):  pantoprazole  Injectable 40 milliGRAM(s) IV Push two times a day    MEDICATIONS  (PRN):  acetaminophen   Tablet .. 650 milliGRAM(s) Oral once PRN Temp greater or equal to 38C (100.4F), Mild Pain (1 - 3)  ondansetron Injectable 4 milliGRAM(s) IV Push every 6 hours PRN Nausea      FAMILY HISTORY:        SOCIAL HISTORY:  Nonsmoker. No ETOH abuse. No illicit drugs.     ROS:     Detailed ten system ROS was performed and was negative except for history as eluded to above.    no fever  no chills  no nausea  no vomiting  no diarrhea  no constipation  no melena  no hematochezia  no chest pain  no palpitations  no sob at rest  no dyspnea on exertion  no cough  no wheezing  no anorexia  no headache  no dizziness  + syncope  no weakness  no myalgia  no dysuria  no polyuria  no hematuria     Vital Signs Last 24 Hrs  T(C): 37 (2020 22:05), Max: 37.2 (2020 10:48)  T(F): 98.6 (2020 22:05), Max: 99 (2020 18:25)  HR: 72 (2020 22:05) (66 - 79)  BP: 142/71 (2020 22:05) (119/95 - 153/85)  BP(mean): 83 (2020 15:20) (83 - 83)  RR: 18 (2020 22:05) (12 - 18)  SpO2: 98% (2020 22:05) (98% - 100%)    I&O's Summary    2020 07:01  -  2020 07:00  --------------------------------------------------------  IN: 323 mL / OUT: 0 mL / NET: 323 mL        PHYSICAL EXAM:    General:                Comfortable, AAO X 3, in no distress. Pallor  HEENT:                  Atraumatic, PERRLA, EOMI, conjunctiva clear.   Neck:                     Supple, no adenopathy, no thyromegaly, no JVD, no bruit.  Back:                     Symmetric, non tender.  Chest:                    Clear, B/L symmetric air entry, no tachypnea  Heart:                     S1, S2 normal, no gallop, no murmur.  Abdomen:              Soft, non-tender, bowel sounds active. No palpable masses.  Extremities:           no cyanosis, no edema. Peripheral pulses normal.  Skin:                      Skin color, texture normal. No rashes.  Neurologic:            Grossly nonfocal.       TELEMETRY:  Normal sinus rhythm with no tachy or kierra events     ECG:  NSR, RBBB    LABS:                          8.3    5.70  )-----------( 315      ( 2020 07:26 )             29.7     06-22    140  |  111<H>  |  15  ----------------------------<  114<H>  4.1   |  26  |  0.87    Ca    9.1      2020 11:34    TPro  7.5  /  Alb  3.2<L>  /  TBili  0.4  /  DBili  x   /  AST  87<H>  /  ALT  44  /  AlkPhos  89          06-22 @ 11:34  Trop-I  <0.015      PT/INR - ( 2020 11:34 )   PT: 12.8 sec;   INR: 1.15 ratio    PTT - ( 2020 11:34 )  PTT:24.5 sec    Urinalysis Basic - ( 2020 17:03 )    Color: Yellow / Appearance: Clear / S.015 / pH: x  Gluc: x / Ketone: Negative  / Bili: Negative / Urobili: Negative mg/dL   Blood: x / Protein: Negative mg/dL / Nitrite: Negative   Leuk Esterase: Negative / RBC: x / WBC x   Sq Epi: x / Non Sq Epi: x / Bacteria: x      RADIOLOGY & ADDITIONAL STUDIES:    CT Abdomen and Pelvis w/ Oral Cont and w/ IV Cont (20 @ 17:32) >  IMPRESSION:   Large mass ascending colon suspicious for colon carcinoma.  Small hepatic region indeterminate but may represent metastatic disease given the colon mass.    Xray Chest 1 View AP/PA. (20 @ 12:19) >  IMPRESSION: No active pulmonary disease.    CT Head No Cont (20 @ 11:48) >  IMPRESSION:    1)  mild involutional changes and volume loss. No acute abnormality suggested.  2)  clear sinuses and mastoids..

## 2020-06-23 NOTE — PROGRESS NOTE ADULT - SUBJECTIVE AND OBJECTIVE BOX
Patient is a 64y old  Male who presents with a chief complaint of Anemia, Syncope (23 Jun 2020 08:39)    HPI:  Patient is 63yo male without sig PMhx, not on any prescription meds, presents to the hospital with syncopal event. As per the patient, he walked into the kitchen and "blacked out" falling onto his L hip. Pt denies preceding symptoms of CP, SOB, palpitations, HA, dizziness. No other constitutional symptoms. Of note patient last MD 3y ago, and was told everything was OK. Pt endorses an intentional 20lb weight loss in last 2 months. Denies melena, BRBPR, abd pain, N/V/D.   In the ER HH 6.9, 2u prbc ordered, unclear baseline. (22 Jun 2020 14:36)    6/23/2020-  Pt feels fine.   Mild lower abdominal discomfort, no pain.   Tolerating clears without any difficulty.   No overt gi bleeding.   Denies ever having a colonoscopy.     PAST MEDICAL & SURGICAL HISTORY:  No pertinent past medical history    MEDICATIONS  (STANDING):  pantoprazole  Injectable 40 milliGRAM(s) IV Push two times a day  polyethylene glycol/electrolyte Solution. 4000 milliLiter(s) Oral once    MEDICATIONS  (PRN):  acetaminophen   Tablet .. 650 milliGRAM(s) Oral once PRN Temp greater or equal to 38C (100.4F), Mild Pain (1 - 3)  ondansetron Injectable 4 milliGRAM(s) IV Push every 6 hours PRN Nausea    Allergies    No Known Allergies    Intolerances      SOCIAL HISTORY:  FAMILY HISTORY:    REVIEW OF SYSTEMS:  CONSTITUTIONAL: No weakness, fevers or chills  RESPIRATORY: No cough, wheezing, hemoptysis; No shortness of breath  CARDIOVASCULAR: No chest pain or palpitations  GASTROINTESTINAL: No abdominal or epigastric pain. No nausea, vomiting, or hematemesis; No diarrhea or constipation. No melena or hematochezia.    Vital Signs Last 24 Hrs  T(C): 37 (22 Jun 2020 22:05), Max: 37.2 (22 Jun 2020 10:48)  T(F): 98.6 (22 Jun 2020 22:05), Max: 99 (22 Jun 2020 18:25)  HR: 72 (22 Jun 2020 22:05) (66 - 79)  BP: 142/71 (22 Jun 2020 22:05) (119/95 - 153/85)  BP(mean): 83 (22 Jun 2020 15:20) (83 - 83)  RR: 18 (22 Jun 2020 22:05) (12 - 18)  SpO2: 98% (22 Jun 2020 22:05) (98% - 100%)    PHYSICAL EXAM:  Constitutional: NAD, well-developed  Respiratory: CTAB  Cardiovascular: S1 and S2, RRR, no M/G/R  Gastrointestinal: BS+, soft, NT/ND    LABS:                        8.3    5.70  )-----------( 315      ( 23 Jun 2020 07:26 )             29.7     06-23    140  |  109<H>  |  8   ----------------------------<  88  3.8   |  26  |  0.74    Ca    8.9      23 Jun 2020 07:26    TPro  6.6  /  Alb  2.7<L>  /  TBili  0.6  /  DBili  x   /  AST  49<H>  /  ALT  34  /  AlkPhos  79  06-23    PT/INR - ( 22 Jun 2020 11:34 )   PT: 12.8 sec;   INR: 1.15 ratio         PTT - ( 22 Jun 2020 11:34 )  PTT:24.5 sec  LIVER FUNCTIONS - ( 23 Jun 2020 07:26 )  Alb: 2.7 g/dL / Pro: 6.6 gm/dL / ALK PHOS: 79 U/L / ALT: 34 U/L / AST: 49 U/L / GGT: x             RADIOLOGY & ADDITIONAL STUDIES:

## 2020-06-23 NOTE — PROGRESS NOTE ADULT - ASSESSMENT
63 y/o Male  without any significant PMHx came to ED on 6/22/20 for evaluation of a syncopal episode and found with h/h 6.9/26.4 and a CT of the abdomen and pelvis remarkable for a large mass in the ascending colon suspicious for colon cancer.     #Syncope event in the setting of anemia   - At admission H/H 6.9/26.4   - EKG remarkable for RBBB and no ST or T wave inversions  - Troponin negatives   - s/p 2PRBC transfusion   - H/H post transfusion 8.3/29.7  - Continue on telemetry   - Cardiology recommendations appreciated   - Monitor vital signs     #Large ascending colonic mass   - CT abdomen showed  a large mass in the ascending colon suspicious for colon carcinoma, small hepatic region indeterminate but may represent metastasis disease.   - He denies hx of melena, hematochezia and fatigue. He endorse 10 pound loss int he last month.  - No family hx of colon cancer   - Gastroenterology recommendations appreciated   - Schedule for colonoscopy with biopsy tomorrow  6/24/20   - NPO after midnight 65 y/o Male  without any significant PMHx came to ED on 6/22/20 for evaluation of a syncopal episode and found with h/h 6.9/26.4 and a CT of the abdomen and pelvis remarkable for a large mass in the ascending colon suspicious for colon cancer.     #Large ascending colonic mass   - CT abdomen showed  a large mass in the ascending colon suspicious for colon carcinoma, small hepatic region indeterminate but may represent metastasis disease.   - He denies hx of melena, hematochezia and fatigue. He endorse 10 pound loss int he last month.  - No family hx of colon cancer   - Gastroenterology recommendations appreciated   - Schedule for colonoscopy with biopsy tomorrow  6/24/20   - NPO after midnight     #Syncope event in the setting of anemia   - At admission H/H 6.9/26.4   - EKG remarkable for RBBB and no ST or T wave inversions  - Troponin negatives   - s/p 2PRBC transfusion   - H/H post transfusion 8.3/29.7  - Continue on telemetry   - Cardiology recommendations appreciated   - Monitor vital signs     #Anemia due to blood loss in the setting of suspected malignancy   - At admission H/H 6.9/26.4   - s/p 2PRBC transfusion   - H/H post transfusion 8.3/29.7  - Trend CBC     #Left hip pain  - Pt reports pain in the left hip that moves to the anterior thigh and knee   - Xray of the left hip/femur showed old left hip pinning with good alignment. No acute fracture.   - Lidocaine patch   - Tylenol PRN pain  - PT evaluation   - Pain mostly secondary to contusion, but if pain continues we will consider ortho evaluation  - Fall precautions    #Transaminitis   - Mild, mostly secondary to possible liver mets   - AST 49, ALT 34   - Avoid hepatotoxic medications   - Trend CMP      #Advance directives   - Full code     #DVT   - SCDs     Case discussed with Dr Wang

## 2020-06-23 NOTE — CONSULT NOTE ADULT - ASSESSMENT
Syncope  Severe anemia  GI bleed  Colonic mass    Suggest: Syncope  RBBB  Severe anemia  GI bleed  Colonic mass    Suggest:    No cardiac contraindication for upper, lower endoscopy  No cardiac contraindication for abdominal surgery for colon mass Syncope  RBBB  Severe anemia  GI bleed  Colonic mass    Suggest:    No cardiac contraindication for upper, lower endoscopy  No cardiac contraindication for abdominal surgery for colon mass  Cardiac work up is stable. Nml EF on echo  Transfuse PRBC  Colorectal surgery evaluation  D/W wife at bedside

## 2020-06-23 NOTE — PROGRESS NOTE ADULT - ASSESSMENT
65y/o male with admitted with syncope/anemia/colon mass with ? liver lesion.   continue clears, keep npo after midnight for colonoscopy w/ biospy.   cleared by Dr. Tavarez for procedure.   trend h/h, transfuse as needed.   discussed with pt, wife, nurse, Dr. Tavarez and Dr. Houser.

## 2020-06-24 ENCOUNTER — RESULT REVIEW (OUTPATIENT)
Age: 65
End: 2020-06-24

## 2020-06-24 LAB
ALBUMIN SERPL ELPH-MCNC: 2.7 G/DL — LOW (ref 3.3–5)
ALP SERPL-CCNC: 82 U/L — SIGNIFICANT CHANGE UP (ref 40–120)
ALT FLD-CCNC: 32 U/L — SIGNIFICANT CHANGE UP (ref 12–78)
ANION GAP SERPL CALC-SCNC: 7 MMOL/L — SIGNIFICANT CHANGE UP (ref 5–17)
AST SERPL-CCNC: 42 U/L — HIGH (ref 15–37)
BASOPHILS # BLD AUTO: 0.04 K/UL — SIGNIFICANT CHANGE UP (ref 0–0.2)
BASOPHILS NFR BLD AUTO: 0.8 % — SIGNIFICANT CHANGE UP (ref 0–2)
BILIRUB SERPL-MCNC: 0.5 MG/DL — SIGNIFICANT CHANGE UP (ref 0.2–1.2)
BUN SERPL-MCNC: 8 MG/DL — SIGNIFICANT CHANGE UP (ref 7–23)
CALCIUM SERPL-MCNC: 8.4 MG/DL — LOW (ref 8.5–10.1)
CHLORIDE SERPL-SCNC: 107 MMOL/L — SIGNIFICANT CHANGE UP (ref 96–108)
CO2 SERPL-SCNC: 26 MMOL/L — SIGNIFICANT CHANGE UP (ref 22–31)
CREAT SERPL-MCNC: 0.77 MG/DL — SIGNIFICANT CHANGE UP (ref 0.5–1.3)
EOSINOPHIL # BLD AUTO: 0.16 K/UL — SIGNIFICANT CHANGE UP (ref 0–0.5)
EOSINOPHIL NFR BLD AUTO: 3.1 % — SIGNIFICANT CHANGE UP (ref 0–6)
GLUCOSE SERPL-MCNC: 89 MG/DL — SIGNIFICANT CHANGE UP (ref 70–99)
HCT VFR BLD CALC: 30.3 % — LOW (ref 39–50)
HGB BLD-MCNC: 8.5 G/DL — LOW (ref 13–17)
IMM GRANULOCYTES NFR BLD AUTO: 0.4 % — SIGNIFICANT CHANGE UP (ref 0–1.5)
LYMPHOCYTES # BLD AUTO: 1.11 K/UL — SIGNIFICANT CHANGE UP (ref 1–3.3)
LYMPHOCYTES # BLD AUTO: 21.9 % — SIGNIFICANT CHANGE UP (ref 13–44)
MCHC RBC-ENTMCNC: 18.1 PG — LOW (ref 27–34)
MCHC RBC-ENTMCNC: 28.1 GM/DL — LOW (ref 32–36)
MCV RBC AUTO: 64.6 FL — LOW (ref 80–100)
MONOCYTES # BLD AUTO: 0.56 K/UL — SIGNIFICANT CHANGE UP (ref 0–0.9)
MONOCYTES NFR BLD AUTO: 11 % — SIGNIFICANT CHANGE UP (ref 2–14)
NEUTROPHILS # BLD AUTO: 3.19 K/UL — SIGNIFICANT CHANGE UP (ref 1.8–7.4)
NEUTROPHILS NFR BLD AUTO: 62.8 % — SIGNIFICANT CHANGE UP (ref 43–77)
PLATELET # BLD AUTO: 320 K/UL — SIGNIFICANT CHANGE UP (ref 150–400)
POTASSIUM SERPL-MCNC: 3.4 MMOL/L — LOW (ref 3.5–5.3)
POTASSIUM SERPL-SCNC: 3.4 MMOL/L — LOW (ref 3.5–5.3)
PROT SERPL-MCNC: 7.1 GM/DL — SIGNIFICANT CHANGE UP (ref 6–8.3)
RBC # BLD: 4.69 M/UL — SIGNIFICANT CHANGE UP (ref 4.2–5.8)
RBC # FLD: 24.3 % — HIGH (ref 10.3–14.5)
SODIUM SERPL-SCNC: 140 MMOL/L — SIGNIFICANT CHANGE UP (ref 135–145)
WBC # BLD: 5.08 K/UL — SIGNIFICANT CHANGE UP (ref 3.8–10.5)
WBC # FLD AUTO: 5.08 K/UL — SIGNIFICANT CHANGE UP (ref 3.8–10.5)

## 2020-06-24 PROCEDURE — 99233 SBSQ HOSP IP/OBS HIGH 50: CPT | Mod: GC

## 2020-06-24 PROCEDURE — 88305 TISSUE EXAM BY PATHOLOGIST: CPT | Mod: 26

## 2020-06-24 RX ORDER — POTASSIUM CHLORIDE 20 MEQ
20 PACKET (EA) ORAL ONCE
Refills: 0 | Status: COMPLETED | OUTPATIENT
Start: 2020-06-24 | End: 2020-06-24

## 2020-06-24 RX ORDER — SODIUM CHLORIDE 9 MG/ML
1000 INJECTION, SOLUTION INTRAVENOUS
Refills: 0 | Status: DISCONTINUED | OUTPATIENT
Start: 2020-06-24 | End: 2020-06-25

## 2020-06-24 RX ADMIN — SODIUM CHLORIDE 100 MILLILITER(S): 9 INJECTION, SOLUTION INTRAVENOUS at 16:21

## 2020-06-24 RX ADMIN — Medication 20 MILLIEQUIVALENT(S): at 18:38

## 2020-06-24 RX ADMIN — LIDOCAINE 1 PATCH: 4 CREAM TOPICAL at 20:03

## 2020-06-24 RX ADMIN — PANTOPRAZOLE SODIUM 40 MILLIGRAM(S): 20 TABLET, DELAYED RELEASE ORAL at 21:11

## 2020-06-24 RX ADMIN — PANTOPRAZOLE SODIUM 40 MILLIGRAM(S): 20 TABLET, DELAYED RELEASE ORAL at 13:25

## 2020-06-24 RX ADMIN — LIDOCAINE 1 PATCH: 4 CREAM TOPICAL at 13:26

## 2020-06-24 RX ADMIN — SODIUM CHLORIDE 100 MILLILITER(S): 9 INJECTION, SOLUTION INTRAVENOUS at 21:10

## 2020-06-24 RX ADMIN — LIDOCAINE 1 PATCH: 4 CREAM TOPICAL at 23:17

## 2020-06-24 RX ADMIN — LIDOCAINE 1 PATCH: 4 CREAM TOPICAL at 05:34

## 2020-06-24 NOTE — PHYSICAL THERAPY INITIAL EVALUATION ADULT - PERTINENT HX OF CURRENT PROBLEM, REHAB EVAL
65 yo M admitted post syncopal event at home. Patient stated  he walked into the kitchen and "blacked out" falling onto his L hip.  H/H on admit low, s/p 2 units of pRBCs. X-rays (-) for fx.  CT A/P: Large mass ascending colon suspicious for colon carcinoma. Small hepatic region indeterminate but may represent metastatic disease given the colon mass. Patient for colonoscopy today.

## 2020-06-24 NOTE — PHYSICAL THERAPY INITIAL EVALUATION ADULT - LEVEL OF INDEPENDENCE: STAIR NEGOTIATION, REHAB EVAL
Patient refused trial, has no stairs at home.  Instructed verbally to use rail and good foot up/bad foot down.

## 2020-06-24 NOTE — PHYSICAL THERAPY INITIAL EVALUATION ADULT - GENERAL OBSERVATIONS, REHAB EVAL
Patient received sitting on the edge of the bed on 3E on his laptop. +HM, +Face mask.   Patient c/o pain in L LE from hip to medial knee.  0/10 at rest, 4/10 with stance.

## 2020-06-24 NOTE — CHART NOTE - NSCHARTNOTEFT_GEN_A_CORE
Colonoscopy to ascending colon. Large friable lesion noted above ileocecal valve. Biopsies done. Patient tolerated procedure well.

## 2020-06-24 NOTE — PHYSICAL THERAPY INITIAL EVALUATION ADULT - MODALITIES TREATMENT COMMENTS
Advised patient to seek out patient physical therapy if pain persists post 2 weeks. Patient independent in functional mobility, no skilled physical therapy need in this setting.  May ambulate per nursing.  Will d/c from PT. RN, CM informed.

## 2020-06-24 NOTE — PROGRESS NOTE ADULT - ASSESSMENT
63 y/o Male  without any significant PMHx came to ED on 6/22/20 for evaluation of a syncopal episode and found with h/h 6.9/26.4 and a CT of the abdomen and pelvis remarkable for a large mass in the ascending colon suspicious for colon cancer.     #Large ascending colonic mass   - CT abdomen showed  a large mass in the ascending colon suspicious for colon carcinoma, small hepatic region indeterminate but may represent metastasis disease.   - He denies hx of melena, hematochezia and fatigue. He endorse 10 pound loss int he last month.  - No family hx of colon cancer   - Gastroenterology recommendations appreciated   - Colonoscopy performed today 6/24/20  and showed a large friable mass above ileocecal valve  - Dr Denny Bobby will performed a resection of the mass tomorrow 6/24/20   - Continue NPO   - Start NS 0.9% at 100ml/hr   - Monitor Vital signs     # Medical clearance   - Pt with no known cardiac or pulmonary history   - EKG remarkable for RBBB   - ECHO performed and showed left ventricle with normal size, wall motion and contractility. LVEF 65-70%.   - RCRI -0  Clas I Risk with a 3.9% of 30 day risk of death   - AUDREY 0%   - Cardiology recommendations appreciated. No contraindication for resection of abdominal mass  - Pt is cleared for resection of ascending colon mass tomorrow 6/24/20       #Syncope event in the setting of anemia   - At admission H/H 6.9/26.4   - EKG remarkable for RBBB and no ST or T wave inversions  - Troponin negatives   - s/p 2PRBC transfusion   - H/H post transfusion 8.5/30.3  - Continue on telemetry   - Cardiology recommendations appreciated   - Monitor vital signs     #Anemia due to blood loss in the setting of suspected malignancy   - At admission H/H 6.9/26.4   - s/p 2PRBC transfusion   - H/H remains stable   - Trend CBC     #Left hip pain  - Pt reports pain in the left hip that moves to the anterior thigh and knee   - Xray of the left hip/femur showed old left hip pinning with good alignment. No acute fracture.   - Lidocaine patch   - Tylenol PRN pain  - PT evaluation   - Pain mostly secondary to contusion, but if pain continues we will consider ortho evaluation  - Fall precautions    #Transaminitis   - Mild, mostly secondary to possible liver mets   - AST 49, ALT 34   - Avoid hepatotoxic medications   - Trend CMP      #Advance directives   - Full code     #DVT   - SCDs     Case discussed with Dr Wang 63 y/o Male  without any significant PMHx came to ED on 6/22/20 for evaluation of a syncopal episode and found with h/h 6.9/26.4 and a CT of the abdomen and pelvis remarkable for a large mass in the ascending colon suspicious for colon cancer.     #Large ascending colonic mass   - CT abdomen showed  a large mass in the ascending colon suspicious for colon carcinoma, small hepatic region indeterminate but may represent metastasis disease.   - He denies hx of melena, hematochezia and fatigue. He endorse 10 pound loss int he last month.  - No family hx of colon cancer   - Gastroenterology recommendations appreciated   - Colonoscopy performed today 6/24/20  and showed a large friable mass above ileocecal valve  - Dr Denny Bobby will perform a resection of the mass tomorrow 6/24/20   - Continue NPO   - Start NS 0.9% at 100ml/hr   - Monitor Vital signs     # Medical clearance   - Pt with no known cardiac or pulmonary history   - EKG remarkable for RBBB   - ECHO performed and showed left ventricle with normal size, wall motion and contractility. LVEF 65-70%.   - RCRI -0  Clas I Risk with a 3.9% of 30 day risk of death   - AUDREY 0%   - Cardiology recommendations appreciated. No contraindication for resection of abdominal mass  - Pt is cleared for resection of ascending colon mass tomorrow 6/24/20     #Syncope event in the setting of anemia   - At admission H/H 6.9/26.4   - EKG remarkable for RBBB and no ST or T wave inversions  - Troponin negatives   - s/p 2PRBC transfusion   - Today H/H 8.5/30.3  - Continue on telemetry   - Cardiology recommendations appreciated   - Monitor vital signs     #Anemia due to blood loss in the setting of suspected malignancy   - At admission H/H 6.9/26.4   - s/p 2PRBC transfusion   - H/H remains stable  - Today H/H 8.5/30.3  - Trend CBC     #Left hip pain  - Pt reports pain in the left hip that moves to the anterior thigh and knee   - Xray of the left hip/femur showed old left hip pinning with good alignment. No acute fracture.   - Lidocaine patch   - Tylenol PRN pain  - PT recommended home or outpatient PT   - Pain mostly secondary to contusion, but if pain continues we will consider ortho evaluation  - Fall precautions    #Hypokalemia   - K 3.4  - KCL 20 mEq PO given  - Monitor K levels      #Transaminitis   - Mild, mostly secondary to possible liver mets   - AST 42, ALT 32   - Avoid hepatotoxic medications   - Trend CMP      #Advance directives   - Full code     #DVT   - SCDs     Case discussed with Dr Wang

## 2020-06-24 NOTE — PROGRESS NOTE ADULT - SUBJECTIVE AND OBJECTIVE BOX
HPI:  Patient is 65yo male without sig PMhx, not on any prescription meds, presents to the hospital with syncopal event. As per the patient, he walked into the kitchen and "blacked out" falling onto his L hip. Pt denies preceding symptoms of CP, SOB, palpitations, HA, dizziness. No other constitutional symptoms. Of note patient last MD 3y ago, and was told everything was OK. Pt endorses an intentional 20lb weight loss in last 2 months. Denies melena, BRBPR, abd pain, N/V/D.   In the ER HH 6.9, 2u prbc ordered, unclear baseline. (2020 14:36)    SUBJECTIVE :   Pt is evaluated at bedside and found NAD and in no acute distress. He had a colonoscopy today that showed a large friable mass above the ileocecal valve, biosy was taken and tolerated procedure well . Dr Mccollum contacted Dr Denny Bobby and he will perform a resection of the mass tomorrow 20. Pt  reports continue with left lower extremity pain upon ambulation but have noticed a mild improvement. He denies abdominal pain, fever, chill, nausea, vomits or any other symptoms.     MEDICATIONS  (STANDING):  lidocaine   Patch 1 Patch Transdermal daily  pantoprazole  Injectable 40 milliGRAM(s) IV Push two times a day    MEDICATIONS  (PRN):  acetaminophen   Tablet .. 650 milliGRAM(s) Oral once PRN Temp greater or equal to 38C (100.4F), Mild Pain (1 - 3)  acetaminophen   Tablet .. 650 milliGRAM(s) Oral every 6 hours PRN Temp greater or equal to 38C (100.4F), Mild Pain (1 - 3)  baclofen 5 milliGRAM(s) Oral once PRN hip pain  ondansetron Injectable 4 milliGRAM(s) IV Push every 6 hours PRN Nausea      Vital Signs Last 24 Hrs  T(C): 37.1 (2020 09:34), Max: 37.2 (2020 18:25)  T(F): 98.7 (2020 09:34), Max: 99 (2020 18:25)  HR: 74 (2020 09:34) (69 - 79)  BP: 150/73 (2020 09:34) (119/95 - 153/85)  BP(mean): 83 (2020 15:20) (83 - 83)  RR: 18 (2020 09:34) (16 - 18)  SpO2: 100% (2020 09:34) (98% - 100%)    GEN: NAD, comfortable, resting in bed  HEENT: NC/AT, EOMI, PERRLA, MMM, clear conjunctiva and sclera, normal hearing, no nasal discharge, throat clear, no thrush, normal dentition.   NECK: supple, no JVD, no LAD, no thyromegaly  CV: S1S2, RRR, no murmur  RESP: good air movement, CTABL, no rales, rhonchi or wheezing, respirations unlabored  ABD: +BS, soft, ND, NT, no guarding, no rigidity, no HSM  EXT: no edema, no calve tenderness  SKIN: No visible Rashes or Ulcers  MSK: Pain to internal and external rotation of the left hip. Pt limbs at ambulation due to pain  NEURO:  sensory and CN 2-12 Grossly intact, no motor deficits,  AOx3  PSYCH: normal behavior         LABS:                          8.3    5.70  )-----------( 315      ( 2020 07:26 )             29.7     2020 07:26    140    |  109    |  8      ----------------------------<  88     3.8     |  26     |  0.74     Ca    8.9        2020 07:26    TPro  6.6    /  Alb  2.7    /  TBili  0.6    /  DBili  x      /  AST  49     /  ALT  34     /  AlkPhos  79     2020 07:26    LIVER FUNCTIONS - ( 2020 07:26 )  Alb: 2.7 g/dL / Pro: 6.6 gm/dL / ALK PHOS: 79 U/L / ALT: 34 U/L / AST: 49 U/L / GGT: x           PT/INR - ( 2020 11:34 )   PT: 12.8 sec;   INR: 1.15 ratio         PTT - ( 2020 11:34 )  PTT:24.5 sec  CAPILLARY BLOOD GLUCOSE        CARDIAC MARKERS ( 2020 11:34 )  <0.015 ng/mL / x     / x     / x     / x          Urinalysis Basic - ( 2020 17:03 )    Color: Yellow / Appearance: Clear / S.015 / pH: x  Gluc: x / Ketone: Negative  / Bili: Negative / Urobili: Negative mg/dL   Blood: x / Protein: Negative mg/dL / Nitrite: Negative   Leuk Esterase: Negative / RBC: x / WBC x   Sq Epi: x / Non Sq Epi: x / Bacteria: x        RADIOLOGY:    CT Abdomen and Pelvis w/ Oral Cont and w/ IV Cont (20 @ 17:32) >  FINDINGS:  LOWER CHEST: Within normal limits.    LIVER: Single nonspecific rounded hypodensity in the right hepatic lobe at the dome measuring 1.2 cm.  BILE DUCTS: Normal caliber.  GALLBLADDER: Within normal limits.  SPLEEN: Within normal limits.  PANCREAS: Within normal limits.  ADRENALS: Within normal limits.  KIDNEYS/URETERS: Within normal limits.    BLADDER: Within normal limits.  REPRODUCTIVE ORGANS: Prostate within normal limits.    BOWEL: No bowel obstruction. There is a soft tissue mass in the proximal ascending colon justabove the level of the ileocecal valve. This measures 6.2 x 3.5 x 6.0 cm AP and is suspicious for colon carcinoma. Direct visualization is recommended.  PERITONEUM: No ascites.  VESSELS: Within normal limits.  RETROPERITONEUM/LYMPH NODES: No lymphadenopathy.    ABDOMINAL WALL: Midline fat-containing ventral hernia.  BONES: Compression screws are seen in the left hip. There are mild degenerative changes of the spine with mild dextroscoliosis    IMPRESSION:   Large mass ascending colon suspicious for colon carcinoma.  Small hepatic region indeterminate but may represent metastatic disease given the colon mass.  Findings were discussed with the hospitalist Dr. Beltran at 6:10 PM on 2020.       Xray Hip w/ Pelvis 2 or 3 Views, Left (20 @ 12:51) >  INTERPRETATION:  Left hip with pelvis and left femur. Patient has pain after a fall.    Left hip with pelvis. 5 views.    There is hip pinning on the left with good alignment. The original fracture appears healed.    There is some chronic deformity around both sides of the pubic symphysis.    Hips appear free of degeneration.    No bone destruction or acute fracture is evident.    Left femur. 4 views.    There is no knee effusion and slight knee degeneration.    No bone destruction or fracture is evident.    IMPRESSION: Old left hip pinning with good alignment. No acute fracture.    < end of copied text >    < from: TTE Echo Complete w/o Contrast w/ Doppler (20 @ 07:44) >     The left ventricle is normal in size, wall motion and contractility.   Mild concentric left ventricular hypertrophy is present.   Estimated left ventricular ejection fraction is 65-70 %.   Normal appearing left atrium.   Normal appearing right atrium.   Normal appearing right ventricle structure and function.   Normal aortic valve structure and function.   Trace mitral regurgitation is present.   Normal appearing tricuspid valve structure and function.   Normal appearing pulmonic valve structure and function.   No evidence of pericardial effusion.   No evidence of pleural effusion.   All visualized extra cardiac structures appears to be normal.    < end of copied text >

## 2020-06-25 ENCOUNTER — RESULT REVIEW (OUTPATIENT)
Age: 65
End: 2020-06-25

## 2020-06-25 LAB
ANION GAP SERPL CALC-SCNC: 9 MMOL/L — SIGNIFICANT CHANGE UP (ref 5–17)
APTT BLD: 25.2 SEC — LOW (ref 27.5–36.3)
BASOPHILS # BLD AUTO: 0 K/UL — SIGNIFICANT CHANGE UP (ref 0–0.2)
BASOPHILS NFR BLD AUTO: 0 % — SIGNIFICANT CHANGE UP (ref 0–2)
BUN SERPL-MCNC: 9 MG/DL — SIGNIFICANT CHANGE UP (ref 7–23)
CALCIUM SERPL-MCNC: 8.6 MG/DL — SIGNIFICANT CHANGE UP (ref 8.5–10.1)
CHLORIDE SERPL-SCNC: 109 MMOL/L — HIGH (ref 96–108)
CO2 SERPL-SCNC: 23 MMOL/L — SIGNIFICANT CHANGE UP (ref 22–31)
CREAT SERPL-MCNC: 0.68 MG/DL — SIGNIFICANT CHANGE UP (ref 0.5–1.3)
EOSINOPHIL # BLD AUTO: 0.22 K/UL — SIGNIFICANT CHANGE UP (ref 0–0.5)
EOSINOPHIL NFR BLD AUTO: 4 % — SIGNIFICANT CHANGE UP (ref 0–6)
GLUCOSE SERPL-MCNC: 66 MG/DL — LOW (ref 70–99)
HCT VFR BLD CALC: 28.8 % — LOW (ref 39–50)
HGB BLD-MCNC: 8.2 G/DL — LOW (ref 13–17)
INR BLD: 1.17 RATIO — HIGH (ref 0.88–1.16)
LYMPHOCYTES # BLD AUTO: 0.78 K/UL — LOW (ref 1–3.3)
LYMPHOCYTES # BLD AUTO: 14 % — SIGNIFICANT CHANGE UP (ref 13–44)
MAGNESIUM SERPL-MCNC: 1.9 MG/DL — SIGNIFICANT CHANGE UP (ref 1.6–2.6)
MCHC RBC-ENTMCNC: 18.7 PG — LOW (ref 27–34)
MCHC RBC-ENTMCNC: 28.5 GM/DL — LOW (ref 32–36)
MCV RBC AUTO: 65.6 FL — LOW (ref 80–100)
MONOCYTES # BLD AUTO: 0.56 K/UL — SIGNIFICANT CHANGE UP (ref 0–0.9)
MONOCYTES NFR BLD AUTO: 10 % — SIGNIFICANT CHANGE UP (ref 2–14)
NEUTROPHILS # BLD AUTO: 4.01 K/UL — SIGNIFICANT CHANGE UP (ref 1.8–7.4)
NEUTROPHILS NFR BLD AUTO: 72 % — SIGNIFICANT CHANGE UP (ref 43–77)
NRBC # BLD: SIGNIFICANT CHANGE UP /100 WBCS (ref 0–0)
PHOSPHATE SERPL-MCNC: 3.1 MG/DL — SIGNIFICANT CHANGE UP (ref 2.5–4.5)
PLATELET # BLD AUTO: 295 K/UL — SIGNIFICANT CHANGE UP (ref 150–400)
POTASSIUM SERPL-MCNC: 3.7 MMOL/L — SIGNIFICANT CHANGE UP (ref 3.5–5.3)
POTASSIUM SERPL-SCNC: 3.7 MMOL/L — SIGNIFICANT CHANGE UP (ref 3.5–5.3)
PROTHROM AB SERPL-ACNC: 13.1 SEC — HIGH (ref 10–12.9)
RBC # BLD: 4.39 M/UL — SIGNIFICANT CHANGE UP (ref 4.2–5.8)
RBC # FLD: 24.9 % — HIGH (ref 10.3–14.5)
SODIUM SERPL-SCNC: 141 MMOL/L — SIGNIFICANT CHANGE UP (ref 135–145)
WBC # BLD: 5.57 K/UL — SIGNIFICANT CHANGE UP (ref 3.8–10.5)
WBC # FLD AUTO: 5.57 K/UL — SIGNIFICANT CHANGE UP (ref 3.8–10.5)

## 2020-06-25 PROCEDURE — 88342 IMHCHEM/IMCYTCHM 1ST ANTB: CPT | Mod: 26

## 2020-06-25 PROCEDURE — 88309 TISSUE EXAM BY PATHOLOGIST: CPT | Mod: 26

## 2020-06-25 PROCEDURE — 88341 IMHCHEM/IMCYTCHM EA ADD ANTB: CPT | Mod: 26

## 2020-06-25 PROCEDURE — 99233 SBSQ HOSP IP/OBS HIGH 50: CPT | Mod: GC

## 2020-06-25 PROCEDURE — 44140 PARTIAL REMOVAL OF COLON: CPT | Mod: AS

## 2020-06-25 RX ORDER — HYDROMORPHONE HYDROCHLORIDE 2 MG/ML
0.5 INJECTION INTRAMUSCULAR; INTRAVENOUS; SUBCUTANEOUS
Refills: 0 | Status: DISCONTINUED | OUTPATIENT
Start: 2020-06-25 | End: 2020-06-27

## 2020-06-25 RX ORDER — ACETAMINOPHEN 500 MG
650 TABLET ORAL EVERY 6 HOURS
Refills: 0 | Status: DISCONTINUED | OUTPATIENT
Start: 2020-06-25 | End: 2020-06-30

## 2020-06-25 RX ORDER — METOPROLOL TARTRATE 50 MG
5 TABLET ORAL ONCE
Refills: 0 | Status: COMPLETED | OUTPATIENT
Start: 2020-06-25 | End: 2020-06-25

## 2020-06-25 RX ORDER — HYDROMORPHONE HYDROCHLORIDE 2 MG/ML
30 INJECTION INTRAMUSCULAR; INTRAVENOUS; SUBCUTANEOUS
Refills: 0 | Status: DISCONTINUED | OUTPATIENT
Start: 2020-06-25 | End: 2020-06-27

## 2020-06-25 RX ORDER — CEFOTETAN DISODIUM 1 G
2 VIAL (EA) INJECTION EVERY 12 HOURS
Refills: 0 | Status: DISCONTINUED | OUTPATIENT
Start: 2020-06-25 | End: 2020-06-25

## 2020-06-25 RX ORDER — CEFOTETAN DISODIUM 1 G
2 VIAL (EA) INJECTION ONCE
Refills: 0 | Status: COMPLETED | OUTPATIENT
Start: 2020-06-26 | End: 2020-06-26

## 2020-06-25 RX ORDER — SODIUM CHLORIDE 9 MG/ML
1000 INJECTION, SOLUTION INTRAVENOUS
Refills: 0 | Status: DISCONTINUED | OUTPATIENT
Start: 2020-06-25 | End: 2020-06-26

## 2020-06-25 RX ORDER — ONDANSETRON 8 MG/1
4 TABLET, FILM COATED ORAL EVERY 6 HOURS
Refills: 0 | Status: DISCONTINUED | OUTPATIENT
Start: 2020-06-25 | End: 2020-06-27

## 2020-06-25 RX ORDER — ACETAMINOPHEN 500 MG
650 TABLET ORAL ONCE
Refills: 0 | Status: DISCONTINUED | OUTPATIENT
Start: 2020-06-25 | End: 2020-06-30

## 2020-06-25 RX ORDER — SODIUM CHLORIDE 9 MG/ML
1000 INJECTION, SOLUTION INTRAVENOUS
Refills: 0 | Status: DISCONTINUED | OUTPATIENT
Start: 2020-06-25 | End: 2020-06-25

## 2020-06-25 RX ORDER — NALOXONE HYDROCHLORIDE 4 MG/.1ML
0.1 SPRAY NASAL
Refills: 0 | Status: DISCONTINUED | OUTPATIENT
Start: 2020-06-25 | End: 2020-06-27

## 2020-06-25 RX ORDER — HYDRALAZINE HCL 50 MG
5 TABLET ORAL ONCE
Refills: 0 | Status: COMPLETED | OUTPATIENT
Start: 2020-06-25 | End: 2020-06-25

## 2020-06-25 RX ORDER — PANTOPRAZOLE SODIUM 20 MG/1
40 TABLET, DELAYED RELEASE ORAL
Refills: 0 | Status: DISCONTINUED | OUTPATIENT
Start: 2020-06-25 | End: 2020-06-30

## 2020-06-25 RX ADMIN — LIDOCAINE 1 PATCH: 4 CREAM TOPICAL at 21:32

## 2020-06-25 RX ADMIN — SODIUM CHLORIDE 100 MILLILITER(S): 9 INJECTION, SOLUTION INTRAVENOUS at 17:03

## 2020-06-25 RX ADMIN — HYDROMORPHONE HYDROCHLORIDE 0.5 MILLIGRAM(S): 2 INJECTION INTRAMUSCULAR; INTRAVENOUS; SUBCUTANEOUS at 17:02

## 2020-06-25 RX ADMIN — HYDROMORPHONE HYDROCHLORIDE 30 MILLILITER(S): 2 INJECTION INTRAMUSCULAR; INTRAVENOUS; SUBCUTANEOUS at 16:51

## 2020-06-25 RX ADMIN — HYDROMORPHONE HYDROCHLORIDE 0.5 MILLIGRAM(S): 2 INJECTION INTRAMUSCULAR; INTRAVENOUS; SUBCUTANEOUS at 17:32

## 2020-06-25 RX ADMIN — Medication 5 MILLIGRAM(S): at 18:00

## 2020-06-25 RX ADMIN — LIDOCAINE 1 PATCH: 4 CREAM TOPICAL at 10:10

## 2020-06-25 RX ADMIN — PANTOPRAZOLE SODIUM 40 MILLIGRAM(S): 20 TABLET, DELAYED RELEASE ORAL at 10:10

## 2020-06-25 RX ADMIN — PANTOPRAZOLE SODIUM 40 MILLIGRAM(S): 20 TABLET, DELAYED RELEASE ORAL at 21:16

## 2020-06-25 RX ADMIN — SODIUM CHLORIDE 100 MILLILITER(S): 9 INJECTION, SOLUTION INTRAVENOUS at 13:11

## 2020-06-25 RX ADMIN — Medication 5 MILLIGRAM(S): at 17:40

## 2020-06-25 NOTE — PROGRESS NOTE ADULT - ASSESSMENT
65 y/o Male  without any significant PMHx came to ED on 6/22/20 for evaluation of a syncopal episode and found with h/h 6.9/26.4 and a CT of the abdomen and pelvis remarkable for a large mass in the ascending colon suspicious for colon cancer.     #Large ascending colonic mass   - CT abdomen showed  a large mass in the ascending colon suspicious for colon carcinoma, small hepatic region indeterminate but may represent metastasis disease.   - He denies hx of melena, hematochezia and fatigue. He endorse 10 pound loss int he last month.  - No family hx of colon cancer   - Gastroenterology recommendations appreciated   - Colonoscopy performed on 6/24/20  and showed a large friable mass above ileocecal valve  - Dr Denny Bobby will perform a resection of the mass today 6/25/20   - Continue NPO   - Continue NS 0.9% at 100ml/hr   - Monitor Vital signs     # Medical clearance   - Pt with no known cardiac or pulmonary history   - EKG remarkable for RBBB   - ECHO performed and showed left ventricle with normal size, wall motion and contractility. LVEF 65-70%.   - RCRI -0  Clas I Risk with a 3.9% of 30 day risk of death   - AUDREY 0%   - Cardiology recommendations appreciated. No contraindication for resection of abdominal mass  - Pt is cleared for resection of ascending colon mass tomorrow 6/24/20     #Syncope event in the setting of anemia   - At admission H/H 6.9/26.4   - EKG remarkable for RBBB and no ST or T wave inversions  - Troponin negatives   - s/p 2PRBC transfusion   - Today H/H 8.2/28.8  - Continue on telemetry   - Cardiology recommendations appreciated   - Monitor vital signs     #Anemia due to blood loss in the setting of suspected malignancy   - At admission H/H 6.9/26.4   - s/p 2PRBC transfusion   - H/H remains stable  - Today H/H 8.2/ 28.8  - Trend CBC     #Left hip pain  - Pt reported pain in the left hip that moves to the anterior thigh and knee   - Pain is better than previous days   - X ray of the left hip/femur showed old left hip pinning with good alignment. No acute fracture.   - Continue Lidocaine patch   - Tylenol PRN pain  - PT recommended home or outpatient PT   - Pain mostly secondary to contusion  - Fall precautions    #Hypokalemia   - Resolved   - K 3.7   - Monitor K levels      #Transaminitis   - Mild, mostly secondary to possible liver mets   - AST 42, ALT 32   - Avoid hepatotoxic medications   - Trend CMP      #Advance directives   - Full code     #DVT   - SCDs     Case discussed with Dr Wang

## 2020-06-25 NOTE — PROGRESS NOTE ADULT - SUBJECTIVE AND OBJECTIVE BOX
HPI:  Patient is 63yo male without sig PMhx, not on any prescription meds, presents to the hospital with syncopal event. As per the patient, he walked into the kitchen and "blacked out" falling onto his L hip. Pt denies preceding symptoms of CP, SOB, palpitations, HA, dizziness. No other constitutional symptoms. Of note patient last MD 3y ago, and was told everything was OK. Pt endorses an intentional 20lb weight loss in last 2 months. Denies melena, BRBPR, abd pain, N/V/D.   In the ER HH 6.9, 2u prbc ordered, unclear baseline. (2020 14:36)    SUBJECTIVE :   Pt is evaluated at bedside and found NAD and in no acute distress. He had a colonoscopy one day ago that showed a large friable mass above the ileocecal valve, biopsy was taken and tolerated procedure well . He is schedule for a colon mass resection today with Dr Bobby. Pt reports left leg pain is better than previous days. He only reports mild pain at ambulation. He denies fever abdominal pain, nausea, vomits or any other symptoms.     MEDICATIONS  (STANDING):  lidocaine   Patch 1 Patch Transdermal daily  pantoprazole  Injectable 40 milliGRAM(s) IV Push two times a day    MEDICATIONS  (PRN):  acetaminophen   Tablet .. 650 milliGRAM(s) Oral once PRN Temp greater or equal to 38C (100.4F), Mild Pain (1 - 3)  acetaminophen   Tablet .. 650 milliGRAM(s) Oral every 6 hours PRN Temp greater or equal to 38C (100.4F), Mild Pain (1 - 3)  baclofen 5 milliGRAM(s) Oral once PRN hip pain  ondansetron Injectable 4 milliGRAM(s) IV Push every 6 hours PRN Nausea      Vital Signs Last 24 Hrs  T(C): 37.1 (2020 09:34), Max: 37.2 (2020 18:25)  T(F): 98.7 (2020 09:34), Max: 99 (2020 18:25)  HR: 74 (2020 09:34) (69 - 79)  BP: 150/73 (2020 09:34) (119/95 - 153/85)  BP(mean): 83 (2020 15:20) (83 - 83)  RR: 18 (2020 09:34) (16 - 18)  SpO2: 100% (2020 09:34) (98% - 100%)    GEN: NAD, comfortable, resting in bed  HEENT: NC/AT, EOMI, PERRLA, MMM, clear conjunctiva and sclera, normal hearing, no nasal discharge, throat clear, no thrush, normal dentition.   NECK: supple, no JVD, no LAD, no thyromegaly  CV: S1S2, RRR, no murmur  RESP: good air movement, CTABL, no rales, rhonchi or wheezing, respirations unlabored  ABD: +BS, soft, ND, NT, no guarding, no rigidity, no HSM  EXT: no edema, no calve tenderness  SKIN: No visible Rashes or Ulcers  MSK: Improving pain at internal and external rotation of the left hip.   NEURO:  sensory and CN 2-12 Grossly intact, no motor deficits,  AOx3  PSYCH: normal behavior         LABS:                          8.3    5.70  )-----------( 315      ( 2020 07:26 )             29.7     2020 07:26    140    |  109    |  8      ----------------------------<  88     3.8     |  26     |  0.74     Ca    8.9        2020 07:26    TPro  6.6    /  Alb  2.7    /  TBili  0.6    /  DBili  x      /  AST  49     /  ALT  34     /  AlkPhos  79     2020 07:26    LIVER FUNCTIONS - ( 2020 07:26 )  Alb: 2.7 g/dL / Pro: 6.6 gm/dL / ALK PHOS: 79 U/L / ALT: 34 U/L / AST: 49 U/L / GGT: x           PT/INR - ( 2020 11:34 )   PT: 12.8 sec;   INR: 1.15 ratio         PTT - ( 2020 11:34 )  PTT:24.5 sec  CAPILLARY BLOOD GLUCOSE        CARDIAC MARKERS ( 2020 11:34 )  <0.015 ng/mL / x     / x     / x     / x          Urinalysis Basic - ( 2020 17:03 )    Color: Yellow / Appearance: Clear / S.015 / pH: x  Gluc: x / Ketone: Negative  / Bili: Negative / Urobili: Negative mg/dL   Blood: x / Protein: Negative mg/dL / Nitrite: Negative   Leuk Esterase: Negative / RBC: x / WBC x   Sq Epi: x / Non Sq Epi: x / Bacteria: x        RADIOLOGY:    CT Abdomen and Pelvis w/ Oral Cont and w/ IV Cont (20 @ 17:32) >  FINDINGS:  LOWER CHEST: Within normal limits.    LIVER: Single nonspecific rounded hypodensity in the right hepatic lobe at the dome measuring 1.2 cm.  BILE DUCTS: Normal caliber.  GALLBLADDER: Within normal limits.  SPLEEN: Within normal limits.  PANCREAS: Within normal limits.  ADRENALS: Within normal limits.  KIDNEYS/URETERS: Within normal limits.    BLADDER: Within normal limits.  REPRODUCTIVE ORGANS: Prostate within normal limits.    BOWEL: No bowel obstruction. There is a soft tissue mass in the proximal ascending colon justabove the level of the ileocecal valve. This measures 6.2 x 3.5 x 6.0 cm AP and is suspicious for colon carcinoma. Direct visualization is recommended.  PERITONEUM: No ascites.  VESSELS: Within normal limits.  RETROPERITONEUM/LYMPH NODES: No lymphadenopathy.    ABDOMINAL WALL: Midline fat-containing ventral hernia.  BONES: Compression screws are seen in the left hip. There are mild degenerative changes of the spine with mild dextroscoliosis    IMPRESSION:   Large mass ascending colon suspicious for colon carcinoma.  Small hepatic region indeterminate but may represent metastatic disease given the colon mass.  Findings were discussed with the hospitalist Dr. Beltran at 6:10 PM on 2020.       Xray Hip w/ Pelvis 2 or 3 Views, Left (20 @ 12:51) >  INTERPRETATION:  Left hip with pelvis and left femur. Patient has pain after a fall.    Left hip with pelvis. 5 views.    There is hip pinning on the left with good alignment. The original fracture appears healed.    There is some chronic deformity around both sides of the pubic symphysis.    Hips appear free of degeneration.    No bone destruction or acute fracture is evident.    Left femur. 4 views.    There is no knee effusion and slight knee degeneration.    No bone destruction or fracture is evident.    IMPRESSION: Old left hip pinning with good alignment. No acute fracture.    < end of copied text >    < from: TTE Echo Complete w/o Contrast w/ Doppler (20 @ 07:44) >     The left ventricle is normal in size, wall motion and contractility.   Mild concentric left ventricular hypertrophy is present.   Estimated left ventricular ejection fraction is 65-70 %.   Normal appearing left atrium.   Normal appearing right atrium.   Normal appearing right ventricle structure and function.   Normal aortic valve structure and function.   Trace mitral regurgitation is present.   Normal appearing tricuspid valve structure and function.   Normal appearing pulmonic valve structure and function.   No evidence of pericardial effusion.   No evidence of pleural effusion.   All visualized extra cardiac structures appears to be normal.    < end of copied text >

## 2020-06-25 NOTE — CHART NOTE - NSCHARTNOTEFT_GEN_A_CORE
Post Op Note   ( medicine )     64 YOWM admitted with syncope due to  anemia from GI blood loss caused by colonic tumor.  Patient underwent uneventful right hemicolectomy this afternoon with appx 100 cc blood loss.   He is currently moderately lethargic but responsive to name and follows basic commands.  found to be moderately hypertensive in the PACU and treated with medication and adjustment of IV fluids.    Vital Signs Last 24 Hrs  T(C): 36.7 (25 Jun 2020 18:43), Max: 37.1 (25 Jun 2020 09:27)  T(F): 98.1 (25 Jun 2020 18:43), Max: 98.8 (25 Jun 2020 09:27)  HR: 79 (25 Jun 2020 18:43) (68 - 100)  BP: 168/78 (25 Jun 2020 18:43) (139/70 - 184/79)  RR: 16 (25 Jun 2020 18:43) (12 - 18)  SpO2: 100% (25 Jun 2020 18:43) (99% - 100%)    PE  moderately lethargic responds to name and commands        No facial asymmetry        PERRLA @ 4 mm         Neck supple , no JVD        Lungs  no rales ,rubs or rhonchi         Heart  Tachycardic 110 / min.  grade ll / Vl systolic murmur radiating form the apex to the left axilla        Abdomen  no audible bowel sounds, the dressing is dry. no evidence of bleeding        Extremities with  intermittent pneumatic compression stockings  DP Pulses 2+ bilateral        Neuro  unable to fully evaluate due to post anesthesia state.                   moves all extremities  no facial asymmetry noted     Impression   S/P/ Right hemicolectomy ( cancerous tumor Post Op Note   ( medicine )     64 YOWM admitted with syncope due to  anemia from GI blood loss caused by colonic tumor.  Patient underwent uneventful right hemicolectomy this afternoon with appx 100 cc blood loss.   He is currently moderately lethargic but responsive to name and follows basic commands.  found to be moderately hypertensive in the PACU and treated with medication and adjustment of IV fluids.    Vital Signs Last 24 Hrs  T(C): 36.7 (25 Jun 2020 18:43), Max: 37.1 (25 Jun 2020 09:27)  T(F): 98.1 (25 Jun 2020 18:43), Max: 98.8 (25 Jun 2020 09:27)  HR: 79 (25 Jun 2020 18:43) (68 - 100)  BP: 168/78 (25 Jun 2020 18:43) (139/70 - 184/79)  RR: 16 (25 Jun 2020 18:43) (12 - 18)  SpO2: 100% (25 Jun 2020 18:43) (99% - 100%)    PE  moderately lethargic responds to name and commands        No facial asymmetry        PERRLA @ 4 mm         Neck supple , no JVD        Lungs  no rales ,rubs or rhonchi         Heart  Tachycardic 110 / min.  grade ll / Vl systolic murmur radiating form the apex to the left axilla        Abdomen  no audible bowel sounds, the dressing is dry. no evidence of bleeding        Extremities with  intermittent pneumatic compression stockings  DP Pulses 2+ bilateral        Neuro  unable to fully evaluate due to post anesthesia state.                   moves all extremities  no facial asymmetry noted     Impression   S/P/ Right hemicolectomy ( cancerous tumor )                     S/P old left Hip ORIF     Plan continue post op care,                      Anti embolism stockings                     Monitor 02 Sat                     Monitor vital signs                     Wound care per protocol

## 2020-06-26 LAB
ANION GAP SERPL CALC-SCNC: 6 MMOL/L — SIGNIFICANT CHANGE UP (ref 5–17)
BASOPHILS # BLD AUTO: 0.01 K/UL — SIGNIFICANT CHANGE UP (ref 0–0.2)
BASOPHILS NFR BLD AUTO: 0.1 % — SIGNIFICANT CHANGE UP (ref 0–2)
BUN SERPL-MCNC: 14 MG/DL — SIGNIFICANT CHANGE UP (ref 7–23)
CALCIUM SERPL-MCNC: 8.7 MG/DL — SIGNIFICANT CHANGE UP (ref 8.5–10.1)
CHLORIDE SERPL-SCNC: 108 MMOL/L — SIGNIFICANT CHANGE UP (ref 96–108)
CO2 SERPL-SCNC: 25 MMOL/L — SIGNIFICANT CHANGE UP (ref 22–31)
CREAT SERPL-MCNC: 1.04 MG/DL — SIGNIFICANT CHANGE UP (ref 0.5–1.3)
EOSINOPHIL # BLD AUTO: 0 K/UL — SIGNIFICANT CHANGE UP (ref 0–0.5)
EOSINOPHIL NFR BLD AUTO: 0 % — SIGNIFICANT CHANGE UP (ref 0–6)
GLUCOSE SERPL-MCNC: 108 MG/DL — HIGH (ref 70–99)
HCT VFR BLD CALC: 28.6 % — LOW (ref 39–50)
HCT VFR BLD CALC: 29.7 % — LOW (ref 39–50)
HGB BLD-MCNC: 8.1 G/DL — LOW (ref 13–17)
HGB BLD-MCNC: 8.2 G/DL — LOW (ref 13–17)
IMM GRANULOCYTES NFR BLD AUTO: 0.4 % — SIGNIFICANT CHANGE UP (ref 0–1.5)
LYMPHOCYTES # BLD AUTO: 0.93 K/UL — LOW (ref 1–3.3)
LYMPHOCYTES # BLD AUTO: 6.8 % — LOW (ref 13–44)
MAGNESIUM SERPL-MCNC: 1.8 MG/DL — SIGNIFICANT CHANGE UP (ref 1.6–2.6)
MCHC RBC-ENTMCNC: 18.3 PG — LOW (ref 27–34)
MCHC RBC-ENTMCNC: 27.3 GM/DL — LOW (ref 32–36)
MCV RBC AUTO: 67 FL — LOW (ref 80–100)
MONOCYTES # BLD AUTO: 1.19 K/UL — HIGH (ref 0–0.9)
MONOCYTES NFR BLD AUTO: 8.8 % — SIGNIFICANT CHANGE UP (ref 2–14)
NEUTROPHILS # BLD AUTO: 11.41 K/UL — HIGH (ref 1.8–7.4)
NEUTROPHILS NFR BLD AUTO: 83.9 % — HIGH (ref 43–77)
PHOSPHATE SERPL-MCNC: 3.4 MG/DL — SIGNIFICANT CHANGE UP (ref 2.5–4.5)
PLATELET # BLD AUTO: 305 K/UL — SIGNIFICANT CHANGE UP (ref 150–400)
POTASSIUM SERPL-MCNC: 4.6 MMOL/L — SIGNIFICANT CHANGE UP (ref 3.5–5.3)
POTASSIUM SERPL-SCNC: 4.6 MMOL/L — SIGNIFICANT CHANGE UP (ref 3.5–5.3)
RBC # BLD: 4.43 M/UL — SIGNIFICANT CHANGE UP (ref 4.2–5.8)
RBC # FLD: 25.5 % — HIGH (ref 10.3–14.5)
SODIUM SERPL-SCNC: 139 MMOL/L — SIGNIFICANT CHANGE UP (ref 135–145)
WBC # BLD: 13.59 K/UL — HIGH (ref 3.8–10.5)
WBC # FLD AUTO: 13.59 K/UL — HIGH (ref 3.8–10.5)

## 2020-06-26 PROCEDURE — 99231 SBSQ HOSP IP/OBS SF/LOW 25: CPT

## 2020-06-26 PROCEDURE — 99233 SBSQ HOSP IP/OBS HIGH 50: CPT | Mod: GC

## 2020-06-26 RX ORDER — SODIUM CHLORIDE 9 MG/ML
1000 INJECTION, SOLUTION INTRAVENOUS
Refills: 0 | Status: DISCONTINUED | OUTPATIENT
Start: 2020-06-26 | End: 2020-06-27

## 2020-06-26 RX ORDER — HEPARIN SODIUM 5000 [USP'U]/ML
5000 INJECTION INTRAVENOUS; SUBCUTANEOUS EVERY 8 HOURS
Refills: 0 | Status: DISCONTINUED | OUTPATIENT
Start: 2020-06-26 | End: 2020-06-30

## 2020-06-26 RX ADMIN — PANTOPRAZOLE SODIUM 40 MILLIGRAM(S): 20 TABLET, DELAYED RELEASE ORAL at 21:53

## 2020-06-26 RX ADMIN — Medication 100 GRAM(S): at 05:37

## 2020-06-26 RX ADMIN — HEPARIN SODIUM 5000 UNIT(S): 5000 INJECTION INTRAVENOUS; SUBCUTANEOUS at 13:10

## 2020-06-26 RX ADMIN — HEPARIN SODIUM 5000 UNIT(S): 5000 INJECTION INTRAVENOUS; SUBCUTANEOUS at 21:53

## 2020-06-26 RX ADMIN — SODIUM CHLORIDE 100 MILLILITER(S): 9 INJECTION, SOLUTION INTRAVENOUS at 23:29

## 2020-06-26 RX ADMIN — SODIUM CHLORIDE 100 MILLILITER(S): 9 INJECTION, SOLUTION INTRAVENOUS at 13:50

## 2020-06-26 RX ADMIN — PANTOPRAZOLE SODIUM 40 MILLIGRAM(S): 20 TABLET, DELAYED RELEASE ORAL at 09:14

## 2020-06-26 NOTE — PROGRESS NOTE ADULT - ATTENDING COMMENTS
Patient seen and examined with Family Medicine Residents Carolyne Maloney and Ariane Bailon on the Family Medicine Teaching Service.  Agree with history, physical, labs and plan which were reviewed in detail after a face to face encounter with the patient.
See NP note for details. Patient now POD#1. Continue plan as per surgery.
Patient seen and examined with Family Medicine Residents Carolyne Hale,  Valentina Maloney and Ariane Bailon on the Family Medicine Teaching Service.  Agree with history, physical, labs and plan which were reviewed in detail after a face to face encounter with the patient.
Patient seen and examined with Family Medicine Residents Carolyne Hale, Valentina Maloney and Ariane Bailon and PharmD Resident Noor on the Family Medicine Teaching Service.  Agree with history, physical, labs and plan which were reviewed in detail after a face to face encounter with the patient.
Patient seen and examined with Family Medicine Residents Carolyne Hale, Valentina Maloney and Ariane Bailon on the Family Medicine Teaching Service.  Agree with history, physical, labs and plan which were reviewed in detail after a face to face encounter with the patient.

## 2020-06-26 NOTE — PROGRESS NOTE ADULT - SUBJECTIVE AND OBJECTIVE BOX
HPI:  Patient is 63yo male without sig PMhx, not on any prescription meds, presents to the hospital with syncopal event. As per the patient, he walked into the kitchen and "blacked out" falling onto his L hip. Pt denies preceding symptoms of CP, SOB, palpitations, HA, dizziness. No other constitutional symptoms. Of note patient last MD 3y ago, and was told everything was OK. Pt endorses an intentional 20lb weight loss in last 2 months. Denies melena, BRBPR, abd pain, N/V/D.   In the ER HH 6.9, 2u prbc ordered, unclear baseline. (2020 14:36)    SUBJECTIVE :   Pt is evaluated at bedside and found NAD and in no acute distress. Pt is s/p ascending colon mas resection on 20 and tolerated procedure well . He reports abdominal pain is well controlled with current pain regimen. He denies any recent bowel movement and endorse is no passing gas since the surgery. He will continue on NPO and we will continue monitoring . He denies fever, nausea, vomits or any other symptoms.     MEDICATIONS  (STANDING):  lidocaine   Patch 1 Patch Transdermal daily  pantoprazole  Injectable 40 milliGRAM(s) IV Push two times a day    MEDICATIONS  (PRN):  acetaminophen   Tablet .. 650 milliGRAM(s) Oral once PRN Temp greater or equal to 38C (100.4F), Mild Pain (1 - 3)  acetaminophen   Tablet .. 650 milliGRAM(s) Oral every 6 hours PRN Temp greater or equal to 38C (100.4F), Mild Pain (1 - 3)  baclofen 5 milliGRAM(s) Oral once PRN hip pain  ondansetron Injectable 4 milliGRAM(s) IV Push every 6 hours PRN Nausea      Vital Signs Last 24 Hrs  T(C): 37.1 (2020 09:34), Max: 37.2 (2020 18:25)  T(F): 98.7 (2020 09:34), Max: 99 (2020 18:25)  HR: 74 (2020 09:34) (69 - 79)  BP: 150/73 (2020 09:34) (119/95 - 153/85)  BP(mean): 83 (2020 15:20) (83 - 83)  RR: 18 (2020 09:34) (16 - 18)  SpO2: 100% (2020 09:34) (98% - 100%)    GEN: NAD, comfortable, resting in bed  HEENT: NC/AT, EOMI, PERRLA, MMM, clear conjunctiva and sclera, normal hearing, no nasal discharge, throat clear, no thrush, normal dentition.   NECK: supple, no JVD, no LAD, no thyromegaly  CV: S1S2, RRR, no murmur  RESP: good air movement, CTABL, no rales, rhonchi or wheezing, respirations unlabored  ABD:  diminished bowel sounds, soft, ND, NT, no guarding, no rigidity, no HSM. Bandage on place w/o discharge, erythema or bleeding   EXT: no edema, no calve tenderness  SKIN: No visible Rashes or Ulcers  MSK: Improving pain at internal and external rotation of the left hip.   NEURO:  sensory and CN 2-12 Grossly intact, no motor deficits,  AOx3  PSYCH: normal behavior         LABS:                          8.3    5.70  )-----------( 315      ( 2020 07:26 )             29.7     2020 07:26    140    |  109    |  8      ----------------------------<  88     3.8     |  26     |  0.74     Ca    8.9        2020 07:26    TPro  6.6    /  Alb  2.7    /  TBili  0.6    /  DBili  x      /  AST  49     /  ALT  34     /  AlkPhos  79     2020 07:26    LIVER FUNCTIONS - ( 2020 07:26 )  Alb: 2.7 g/dL / Pro: 6.6 gm/dL / ALK PHOS: 79 U/L / ALT: 34 U/L / AST: 49 U/L / GGT: x           PT/INR - ( 2020 11:34 )   PT: 12.8 sec;   INR: 1.15 ratio         PTT - ( 2020 11:34 )  PTT:24.5 sec  CAPILLARY BLOOD GLUCOSE        CARDIAC MARKERS ( 2020 11:34 )  <0.015 ng/mL / x     / x     / x     / x          Urinalysis Basic - ( 2020 17:03 )    Color: Yellow / Appearance: Clear / S.015 / pH: x  Gluc: x / Ketone: Negative  / Bili: Negative / Urobili: Negative mg/dL   Blood: x / Protein: Negative mg/dL / Nitrite: Negative   Leuk Esterase: Negative / RBC: x / WBC x   Sq Epi: x / Non Sq Epi: x / Bacteria: x        RADIOLOGY:    CT Abdomen and Pelvis w/ Oral Cont and w/ IV Cont (20 @ 17:32) >  FINDINGS:  LOWER CHEST: Within normal limits.    LIVER: Single nonspecific rounded hypodensity in the right hepatic lobe at the dome measuring 1.2 cm.  BILE DUCTS: Normal caliber.  GALLBLADDER: Within normal limits.  SPLEEN: Within normal limits.  PANCREAS: Within normal limits.  ADRENALS: Within normal limits.  KIDNEYS/URETERS: Within normal limits.    BLADDER: Within normal limits.  REPRODUCTIVE ORGANS: Prostate within normal limits.    BOWEL: No bowel obstruction. There is a soft tissue mass in the proximal ascending colon justabove the level of the ileocecal valve. This measures 6.2 x 3.5 x 6.0 cm AP and is suspicious for colon carcinoma. Direct visualization is recommended.  PERITONEUM: No ascites.  VESSELS: Within normal limits.  RETROPERITONEUM/LYMPH NODES: No lymphadenopathy.    ABDOMINAL WALL: Midline fat-containing ventral hernia.  BONES: Compression screws are seen in the left hip. There are mild degenerative changes of the spine with mild dextroscoliosis    IMPRESSION:   Large mass ascending colon suspicious for colon carcinoma.  Small hepatic region indeterminate but may represent metastatic disease given the colon mass.  Findings were discussed with the hospitalist Dr. Beltran at 6:10 PM on 2020.       Xray Hip w/ Pelvis 2 or 3 Views, Left (20 @ 12:51) >  INTERPRETATION:  Left hip with pelvis and left femur. Patient has pain after a fall.    Left hip with pelvis. 5 views.    There is hip pinning on the left with good alignment. The original fracture appears healed.    There is some chronic deformity around both sides of the pubic symphysis.    Hips appear free of degeneration.    No bone destruction or acute fracture is evident.    Left femur. 4 views.    There is no knee effusion and slight knee degeneration.    No bone destruction or fracture is evident.    IMPRESSION: Old left hip pinning with good alignment. No acute fracture.    < end of copied text >    < from: TTE Echo Complete w/o Contrast w/ Doppler (20 @ 07:44) >     The left ventricle is normal in size, wall motion and contractility.   Mild concentric left ventricular hypertrophy is present.   Estimated left ventricular ejection fraction is 65-70 %.   Normal appearing left atrium.   Normal appearing right atrium.   Normal appearing right ventricle structure and function.   Normal aortic valve structure and function.   Trace mitral regurgitation is present.   Normal appearing tricuspid valve structure and function.   Normal appearing pulmonic valve structure and function.   No evidence of pericardial effusion.   No evidence of pleural effusion.   All visualized extra cardiac structures appears to be normal.    < end of copied text >

## 2020-06-26 NOTE — PROGRESS NOTE ADULT - SUBJECTIVE AND OBJECTIVE BOX
Subjective: POD#1    Objective: R colon    Heent: N/C, AT PER no scleral icterus, No JVD  Pul: clear  Cor: RRR  Abdomen: soft, normal BS. Wound - clean  Extremities: without edema, motor/sensory intact    06-25    141  |  109<H>  |  9   ----------------------------<  66<L>  3.7   |  23  |  0.68    Ca    8.6      25 Jun 2020 07:46  Phos  3.1     06-25  Mg     1.9     06-25    TPro  7.1  /  Alb  2.7<L>  /  TBili  0.5  /  DBili  x   /  AST  42<H>  /  ALT  32  /  AlkPhos  82  06-24                            8.2    5.57  )-----------( 295      ( 25 Jun 2020 07:46 )             28.8

## 2020-06-26 NOTE — PROGRESS NOTE ADULT - ASSESSMENT
HD stable. Slept well - pain control seems adequate. Cont NPO. Start subQ heparin. Cont IV fluids. Check labs.

## 2020-06-26 NOTE — PROGRESS NOTE ADULT - SUBJECTIVE AND OBJECTIVE BOX
Patient is a 64y old  Male who presents with a chief complaint of Anemia, Syncope (26 Jun 2020 06:38)    HPI:  Patient is 65yo male without sig PMhx, not on any prescription meds, presents to the hospital with syncopal event. As per the patient, he walked into the kitchen and "blacked out" falling onto his L hip. Pt denies preceding symptoms of CP, SOB, palpitations, HA, dizziness. No other constitutional symptoms. Of note patient last MD 3y ago, and was told everything was OK. Pt endorses an intentional 20lb weight loss in last 2 months. Denies melena, BRBPR, abd pain, N/V/D.   In the ER HH 6.9, 2u prbc ordered, unclear baseline. (22 Jun 2020 14:36).    6/26/2020-  Pt feels OK, pain well controlled.   No BMs.    PAST MEDICAL & SURGICAL HISTORY:  No pertinent past medical history    MEDICATIONS  (STANDING):  heparin   Injectable 5000 Unit(s) SubCutaneous every 8 hours  HYDROmorphone PCA (1 mG/mL) 30 milliLiter(s) PCA Continuous PCA Continuous  lactated ringers. 1000 milliLiter(s) (50 mL/Hr) IV Continuous <Continuous>  pantoprazole  Injectable 40 milliGRAM(s) IV Push two times a day  sodium chloride 0.45%. 1000 milliLiter(s) (75 mL/Hr) IV Continuous <Continuous>    MEDICATIONS  (PRN):  acetaminophen   Tablet .. 650 milliGRAM(s) Oral once PRN Temp greater or equal to 38C (100.4F), Mild Pain (1 - 3)  acetaminophen   Tablet .. 650 milliGRAM(s) Oral every 6 hours PRN Temp greater or equal to 38C (100.4F), Mild Pain (1 - 3)  HYDROmorphone  Injectable 0.5 milliGRAM(s) IV Push every 5 minutes PRN Moderate Pain (4 - 6)  HYDROmorphone PCA (1 mG/mL) Rescue Clinician Bolus 0.5 milliGRAM(s) IV Push every 15 minutes PRN for Pain Scale GREATER THAN 6  naloxone Injectable 0.1 milliGRAM(s) IV Push every 3 minutes PRN For ANY of the following changes in patient status:  A. RR LESS THAN 10 breaths per minute, B. Oxygen saturation LESS THAN 90%, C. Sedation score of 6  ondansetron Injectable 4 milliGRAM(s) IV Push every 6 hours PRN Nausea    Allergies    No Known Allergies    Intolerances      SOCIAL HISTORY:  FAMILY HISTORY:    REVIEW OF SYSTEMS:  CONSTITUTIONAL: No weakness, fevers or chills  RESPIRATORY: No cough, wheezing, hemoptysis; No shortness of breath  CARDIOVASCULAR: No chest pain or palpitations  GASTROINTESTINAL: No abdominal or epigastric pain. No nausea, vomiting, or hematemesis; No diarrhea or constipation. No melena or hematochezia.    Vital Signs Last 24 Hrs  T(C): 37 (26 Jun 2020 06:24), Max: 37.1 (25 Jun 2020 22:59)  T(F): 98.6 (26 Jun 2020 06:24), Max: 98.8 (25 Jun 2020 22:59)  HR: 67 (26 Jun 2020 06:24) (67 - 100)  BP: 100/52 (26 Jun 2020 06:24) (100/52 - 184/79)  BP(mean): --  RR: 16 (26 Jun 2020 06:24) (12 - 17)  SpO2: 100% (26 Jun 2020 06:24) (95% - 100%)    PHYSICAL EXAM:  Constitutional: NAD, well-developed  Respiratory: CTAB  Cardiovascular: S1 and S2, RRR, no M/G/R  Gastrointestinal: Midline dressing in place-C/D/I, ND, hypoactive BS    LABS:                        8.1    13.59 )-----------( 305      ( 26 Jun 2020 08:35 )             29.7     06-26    139  |  108  |  14  ----------------------------<  108<H>  4.6   |  25  |  1.04    Ca    8.7      26 Jun 2020 08:35  Phos  3.4     06-26  Mg     1.8     06-26      PT/INR - ( 25 Jun 2020 07:46 )   PT: 13.1 sec;   INR: 1.17 ratio         PTT - ( 25 Jun 2020 07:46 )  PTT:25.2 sec      RADIOLOGY & ADDITIONAL STUDIES:

## 2020-06-26 NOTE — PROGRESS NOTE ADULT - ASSESSMENT
63y/o male with admitted with syncope/anemia/colon mass with ? liver lesion.   s/p right hemicolectomy with Dr. Bobby yesterday.   slowly improving.   trend h/h, transfuse if needed.   supportive care.  awaiting pathology.   discussed with pt, wife, Dr. Rizzo.

## 2020-06-26 NOTE — PROGRESS NOTE ADULT - ASSESSMENT
63 y/o Male  without any significant PMHx came to ED on 6/22/20 for evaluation of a syncopal episode and found with h/h 6.9/26.4 and a CT of the abdomen and pelvis remarkable for a large mass in the ascending colon suspicious for colon cancer.     #Large ascending colonic mass   - CT abdomen showed  a large mass in the ascending colon suspicious for colon carcinoma, small hepatic region indeterminate but may represent metastasis disease.   - He denies hx of melena, hematochezia and fatigue. He endorse 10 pound loss int he last month.  - No family hx of colon cancer   - Gastroenterology recommendations appreciated   - Colonoscopy performed on 6/24/20  and showed a large friable mass above ileocecal valve  - Colonoscopy pathology showed superficial fragments of villoglandular proliferation with high grade dysplasia.   -  s/p resection of colonic mass on 6/25/20 by Dr Denny Bobby  -  F/U pathology report  - Liver lesion could be a liver hemangioma   - Continue NPO   - Start RL 0.9% at 100ml/hr   - On Hydromorphone PCA   - Frequent Abdominal exam  - Monitor H/H   - Monitor Vital signs     #Syncope event in the setting of anemia   - At admission H/H 6.9/26.4   - EKG remarkable for RBBB and no ST or T wave inversions  - Troponin negatives   - s/p 2PRBC transfusion   - Today H/H 8.1/29.7  - Continue on telemetry   - Cardiology recommendations appreciated   - Monitor vital signs     #Anemia due to blood loss in the setting of suspected malignancy   - At admission H/H 6.9/26.4   - s/p 2PRBC transfusion   - H/H remains stable  - Today H/H 8.1/29.7  - Trend CBC     #Leukocytosis   - WBC 13.59   - Mostly reactive in the setting of recent surgery   - No signs of infection   - Trend CBC     #Left hip pain  - Improving   - Pt reported pain in the left hip that moves to the anterior thigh and knee   - Pain is better than previous days   - X ray of the left hip/femur showed old left hip pinning with good alignment. No acute fracture.   - Continue Lidocaine patch   - Tylenol PRN pain  - PT recommended home or outpatient PT   - Pain mostly secondary to contusion  - Fall precautions    #Transaminitis   - Mild, mostly secondary to possible liver mets   - AST 42, ALT 32   - Avoid hepatotoxic medications   - Trend CMP      #Advance directives   - Full code     #DVT   - Heparin subcutaneus     Case discussed with Dr Wang 65 y/o Male  without any significant PMHx came to ED on 6/22/20 for evaluation of a syncopal episode and found with h/h 6.9/26.4 and a CT of the abdomen and pelvis remarkable for a large mass in the ascending colon suspicious for colon cancer.     #Large ascending colonic mass   - CT abdomen showed  a large mass in the ascending colon suspicious for colon carcinoma, small hepatic region indeterminate but may represent metastasis disease.   - He denies hx of melena, hematochezia and fatigue. He endorse 10 pound loss int he last month.  - No family hx of colon cancer   - Gastroenterology recommendations appreciated   - Colonoscopy performed on 6/24/20  and showed a large friable mass above ileocecal valve  - Colonoscopy pathology showed superficial fragments of villoglandular proliferation with high grade dysplasia.   -  s/p resection of colonic mass on 6/25/20 by Dr Denny Bobby  -  F/U pathology report  - Liver lesion could be a liver hemangioma   - Continue NPO   - Start RL 0.9% at 100ml/hr   - On Hydromorphone PCA   - Frequent Abdominal exam  - Monitor H/H   - Monitor Vital signs     #Syncope event in the setting of anemia   - At admission H/H 6.9/26.4   - EKG remarkable for RBBB and no ST or T wave inversions  - Troponin negatives   - s/p 2PRBC transfusion   - Today H/H 8.1/29.7  - Cardiology recommendations appreciated   - Monitor vital signs     #Anemia due to blood loss in the setting of suspected malignancy   - At admission H/H 6.9/26.4   - s/p 2PRBC transfusion   - H/H remains stable  - Today H/H 8.1/29.7  - Trend CBC     #Leukocytosis   - WBC 13.59   - Mostly reactive in the setting of recent surgery   - No signs of infection   - Trend CBC     #Left hip pain  - Improving   - Pt reported pain in the left hip that moves to the anterior thigh and knee   - Pain is better than previous days   - X ray of the left hip/femur showed old left hip pinning with good alignment. No acute fracture.   - Continue Lidocaine patch   - Tylenol PRN pain  - PT recommended home or outpatient PT   - Pain mostly secondary to contusion  - Fall precautions    #Transaminitis   - Mild, mostly secondary to possible liver mets   - AST 42, ALT 32   - Avoid hepatotoxic medications   - Trend CMP      #Advance directives   - Full code     #DVT   - Heparin subcutaneus     Case discussed with Dr Wang

## 2020-06-27 LAB
ANION GAP SERPL CALC-SCNC: 7 MMOL/L — SIGNIFICANT CHANGE UP (ref 5–17)
BASOPHILS # BLD AUTO: 0.02 K/UL — SIGNIFICANT CHANGE UP (ref 0–0.2)
BASOPHILS NFR BLD AUTO: 0.2 % — SIGNIFICANT CHANGE UP (ref 0–2)
BUN SERPL-MCNC: 12 MG/DL — SIGNIFICANT CHANGE UP (ref 7–23)
CALCIUM SERPL-MCNC: 8.3 MG/DL — LOW (ref 8.5–10.1)
CHLORIDE SERPL-SCNC: 106 MMOL/L — SIGNIFICANT CHANGE UP (ref 96–108)
CO2 SERPL-SCNC: 27 MMOL/L — SIGNIFICANT CHANGE UP (ref 22–31)
CREAT SERPL-MCNC: 0.65 MG/DL — SIGNIFICANT CHANGE UP (ref 0.5–1.3)
EOSINOPHIL # BLD AUTO: 0.03 K/UL — SIGNIFICANT CHANGE UP (ref 0–0.5)
EOSINOPHIL NFR BLD AUTO: 0.4 % — SIGNIFICANT CHANGE UP (ref 0–6)
GLUCOSE SERPL-MCNC: 81 MG/DL — SIGNIFICANT CHANGE UP (ref 70–99)
HCT VFR BLD CALC: 29.8 % — LOW (ref 39–50)
HGB BLD-MCNC: 8.2 G/DL — LOW (ref 13–17)
IMM GRANULOCYTES NFR BLD AUTO: 0.2 % — SIGNIFICANT CHANGE UP (ref 0–1.5)
LYMPHOCYTES # BLD AUTO: 1.26 K/UL — SIGNIFICANT CHANGE UP (ref 1–3.3)
LYMPHOCYTES # BLD AUTO: 15.5 % — SIGNIFICANT CHANGE UP (ref 13–44)
MCHC RBC-ENTMCNC: 18.4 PG — LOW (ref 27–34)
MCHC RBC-ENTMCNC: 27.5 GM/DL — LOW (ref 32–36)
MCV RBC AUTO: 67 FL — LOW (ref 80–100)
MONOCYTES # BLD AUTO: 0.74 K/UL — SIGNIFICANT CHANGE UP (ref 0–0.9)
MONOCYTES NFR BLD AUTO: 9.1 % — SIGNIFICANT CHANGE UP (ref 2–14)
NEUTROPHILS # BLD AUTO: 6.07 K/UL — SIGNIFICANT CHANGE UP (ref 1.8–7.4)
NEUTROPHILS NFR BLD AUTO: 74.6 % — SIGNIFICANT CHANGE UP (ref 43–77)
PLATELET # BLD AUTO: 301 K/UL — SIGNIFICANT CHANGE UP (ref 150–400)
POTASSIUM SERPL-MCNC: 3.7 MMOL/L — SIGNIFICANT CHANGE UP (ref 3.5–5.3)
POTASSIUM SERPL-SCNC: 3.7 MMOL/L — SIGNIFICANT CHANGE UP (ref 3.5–5.3)
RBC # BLD: 4.45 M/UL — SIGNIFICANT CHANGE UP (ref 4.2–5.8)
RBC # FLD: 26 % — HIGH (ref 10.3–14.5)
SODIUM SERPL-SCNC: 140 MMOL/L — SIGNIFICANT CHANGE UP (ref 135–145)
WBC # BLD: 8.14 K/UL — SIGNIFICANT CHANGE UP (ref 3.8–10.5)
WBC # FLD AUTO: 8.14 K/UL — SIGNIFICANT CHANGE UP (ref 3.8–10.5)

## 2020-06-27 PROCEDURE — 99232 SBSQ HOSP IP/OBS MODERATE 35: CPT | Mod: GC

## 2020-06-27 PROCEDURE — 99231 SBSQ HOSP IP/OBS SF/LOW 25: CPT

## 2020-06-27 RX ORDER — SODIUM CHLORIDE 9 MG/ML
1000 INJECTION, SOLUTION INTRAVENOUS
Refills: 0 | Status: COMPLETED | OUTPATIENT
Start: 2020-06-27 | End: 2020-06-28

## 2020-06-27 RX ORDER — MORPHINE SULFATE 50 MG/1
4 CAPSULE, EXTENDED RELEASE ORAL THREE TIMES A DAY
Refills: 0 | Status: DISCONTINUED | OUTPATIENT
Start: 2020-06-27 | End: 2020-06-30

## 2020-06-27 RX ORDER — OXYCODONE HYDROCHLORIDE 5 MG/1
5 TABLET ORAL EVERY 6 HOURS
Refills: 0 | Status: DISCONTINUED | OUTPATIENT
Start: 2020-06-27 | End: 2020-06-30

## 2020-06-27 RX ORDER — SODIUM CHLORIDE 9 MG/ML
1000 INJECTION, SOLUTION INTRAVENOUS
Refills: 0 | Status: DISCONTINUED | OUTPATIENT
Start: 2020-06-27 | End: 2020-06-27

## 2020-06-27 RX ADMIN — SODIUM CHLORIDE 100 MILLILITER(S): 9 INJECTION, SOLUTION INTRAVENOUS at 10:11

## 2020-06-27 RX ADMIN — HEPARIN SODIUM 5000 UNIT(S): 5000 INJECTION INTRAVENOUS; SUBCUTANEOUS at 23:30

## 2020-06-27 RX ADMIN — HEPARIN SODIUM 5000 UNIT(S): 5000 INJECTION INTRAVENOUS; SUBCUTANEOUS at 14:29

## 2020-06-27 RX ADMIN — SODIUM CHLORIDE 75 MILLILITER(S): 9 INJECTION, SOLUTION INTRAVENOUS at 23:53

## 2020-06-27 RX ADMIN — HEPARIN SODIUM 5000 UNIT(S): 5000 INJECTION INTRAVENOUS; SUBCUTANEOUS at 05:31

## 2020-06-27 RX ADMIN — PANTOPRAZOLE SODIUM 40 MILLIGRAM(S): 20 TABLET, DELAYED RELEASE ORAL at 23:31

## 2020-06-27 RX ADMIN — PANTOPRAZOLE SODIUM 40 MILLIGRAM(S): 20 TABLET, DELAYED RELEASE ORAL at 10:11

## 2020-06-27 NOTE — PROGRESS NOTE ADULT - ASSESSMENT
Pt has been seen and examined with FP resident, resident supervised agree with a/p       Patient is a 64y old  Male who presents with a chief complaint of Anemia, Syncope (27 Jun 2020 08:23)          PHYSICAL EXAM:  Vital Signs Last 24 Hrs  T(C): 37 (27 Jun 2020 08:05), Max: 37 (27 Jun 2020 08:05)  T(F): 98.6 (27 Jun 2020 08:05), Max: 98.6 (27 Jun 2020 08:05)  HR: 80 (27 Jun 2020 08:05) (75 - 80)  BP: 173/80 (27 Jun 2020 08:05) (123/68 - 173/80)  BP(mean): 94 (26 Jun 2020 21:45) (94 - 94)  RR: 16 (27 Jun 2020 08:05) (16 - 16)  SpO2: 96% (27 Jun 2020 08:05) (95% - 100%)  -rs-aeeb, cta  -cvs-s1s2 normal   -p/a-soft,bs+      A/P    #ct supportive care, pain control,  rest of management as per surgical team     #dvt pr

## 2020-06-27 NOTE — PROGRESS NOTE ADULT - SUBJECTIVE AND OBJECTIVE BOX
Subjective: POD#2    Objective: R colon    Heent: N/C, AT PER no scleral icterus, No JVD  Pul: clear  Cor: RRR  Abdomen: soft, normal BS. Wound - clean, dssg off  Extremities: without edema, motor/sensory intact    06-27    140  |  106  |  12  ----------------------------<  81  3.7   |  27  |  0.65    Ca    8.3<L>      27 Jun 2020 06:34  Phos  3.4     06-26  Mg     1.8     06-26                              8.2    8.14  )-----------( 301      ( 27 Jun 2020 06:34 )             29.8

## 2020-06-27 NOTE — PROGRESS NOTE ADULT - ASSESSMENT
65 yo male s/p right hemicolectomy. Awaiting return of bowel function. Continue plan as per surgery.

## 2020-06-27 NOTE — PROGRESS NOTE ADULT - ASSESSMENT
65 y/o Male  without any significant PMHx came to ED on 6/22/20 for evaluation of a syncopal episode and found with h/h 6.9/26.4 and a CT of the abdomen and pelvis remarkable for a large mass in the ascending colon suspicious for colon cancer.     #Large ascending colonic mass   - CT abdomen showed  a large mass in the ascending colon suspicious for colon carcinoma, small hepatic region indeterminate but may represent metastasis disease.   - He denies hx of melena, hematochezia and fatigue. He endorse 10 pound loss int he last month.  - No family hx of colon cancer   - Gastroenterology recommendations appreciated   - Colonoscopy performed on 6/24/20  and showed a large friable mass above ileocecal valve  - Colonoscopy pathology showed superficial fragments of villoglandular proliferation with high grade dysplasia.   -  s/p resection of colonic mass on 6/25/20 POD#2    -  F/U pathology report  - Liver lesion could be a liver hemangioma   - Continue NPO   - Start D5W% 75ml/hr for 12 hrs, then resume RL if pt still NPO   - s/p Hydromorphone PCA   - Start Morphine 4mg IV TID for severe pain  - Start Oxycodone 5mg PO every 6 hrs for moderate pain  - Frequent Abdominal exam  - Monitor H/H   - Monitor Vital signs     #Syncope event in the setting of anemia   - At admission H/H 6.9/26.4   - EKG remarkable for RBBB and no ST or T wave inversions  - Troponin negatives   - s/p 2PRBC transfusion   - Today H/H 8.2/29.8  - Cardiology recommendations appreciated   - Monitor vital signs     #Anemia due to blood loss in the setting of suspected malignancy   - At admission H/H 6.9/26.4   - s/p 2PRBC transfusion   - H/H remains stable  - Today H/H 8.2/29.8  - Trend CBC     #Leukocytosis   - Improved   - WBC in decreased trend13.59 >> 8.14  - Mostly reactive in the setting of recent surgery   - No signs of infection   - Trend CBC     #Left hip pain  - Improving   - Pt reported pain in the left hip that moves to the anterior thigh and knee   - Pain is better than previous days   - X ray of the left hip/femur showed old left hip pinning with good alignment. No acute fracture.   - Continue Lidocaine patch   - Tylenol PRN pain  - PT recommended home or outpatient PT   - Pain mostly secondary to contusion  - Fall precautions    #Transaminitis   - Mild, mostly secondary to possible liver mets   - AST 42, ALT 32   - Avoid hepatotoxic medications   - Trend CMP      #Advance directives   - Full code     #DVT   - Heparin subcutaneus     Case discussed with Dr Pascual

## 2020-06-27 NOTE — PROGRESS NOTE ADULT - SUBJECTIVE AND OBJECTIVE BOX
HPI:  Patient is 65yo male without sig PMhx, not on any prescription meds, presents to the hospital with syncopal event. As per the patient, he walked into the kitchen and "blacked out" falling onto his L hip. Pt denies preceding symptoms of CP, SOB, palpitations, HA, dizziness. No other constitutional symptoms. Of note patient last MD 3y ago, and was told everything was OK. Pt endorses an intentional 20lb weight loss in last 2 months. Denies melena, BRBPR, abd pain, N/V/D.   In the ER HH 6.9, 2u prbc ordered, unclear baseline. (2020 14:36)    SUBJECTIVE :   Pt is evaluated at bedside and found NAD and in no acute distress. Pt is s/p ascending colon mas resection on 20 and tolerated procedure well . He reports abdominal pain is well controlled with current pain regimen. He denies any recent bowel movement and endorse is no passing gas since the surgery. He will continue on NPO and we will continue monitoring. He denies fever, nausea, vomits or any other symptoms.     MEDICATIONS  (STANDING):  lidocaine   Patch 1 Patch Transdermal daily  pantoprazole  Injectable 40 milliGRAM(s) IV Push two times a day    MEDICATIONS  (PRN):  acetaminophen   Tablet .. 650 milliGRAM(s) Oral once PRN Temp greater or equal to 38C (100.4F), Mild Pain (1 - 3)  acetaminophen   Tablet .. 650 milliGRAM(s) Oral every 6 hours PRN Temp greater or equal to 38C (100.4F), Mild Pain (1 - 3)  baclofen 5 milliGRAM(s) Oral once PRN hip pain  ondansetron Injectable 4 milliGRAM(s) IV Push every 6 hours PRN Nausea      Vital Signs Last 24 Hrs  T(C): 37.1 (2020 09:34), Max: 37.2 (2020 18:25)  T(F): 98.7 (2020 09:34), Max: 99 (2020 18:25)  HR: 74 (2020 09:34) (69 - 79)  BP: 150/73 (2020 09:34) (119/95 - 153/85)  BP(mean): 83 (2020 15:20) (83 - 83)  RR: 18 (2020 09:34) (16 - 18)  SpO2: 100% (2020 09:34) (98% - 100%)    GEN: NAD, comfortable, resting in bed  HEENT: NC/AT, EOMI, PERRLA, MMM, clear conjunctiva and sclera, normal hearing, no nasal discharge, throat clear, no thrush, normal dentition.   NECK: supple, no JVD, no LAD, no thyromegaly  CV: S1S2, RRR, no murmur  RESP: good air movement, CTABL, no rales, rhonchi or wheezing, respirations unlabored  ABD: + bowel sounds, soft, ND, NT, no guarding, no rigidity, no HSM. Bandage on place w/o discharge, erythema or bleeding   EXT: no edema, no calve tenderness  SKIN: No visible Rashes or Ulcers  MSK: Improving pain at internal and external rotation of the left hip.   NEURO:  sensory and CN 2-12 Grossly intact, no motor deficits,  AOx3  PSYCH: normal behavior         LABS:                          8.3    5.70  )-----------( 315      ( 2020 07:26 )             29.7     2020 07:26    140    |  109    |  8      ----------------------------<  88     3.8     |  26     |  0.74     Ca    8.9        2020 07:26    TPro  6.6    /  Alb  2.7    /  TBili  0.6    /  DBili  x      /  AST  49     /  ALT  34     /  AlkPhos  79     2020 07:26    LIVER FUNCTIONS - ( 2020 07:26 )  Alb: 2.7 g/dL / Pro: 6.6 gm/dL / ALK PHOS: 79 U/L / ALT: 34 U/L / AST: 49 U/L / GGT: x           PT/INR - ( 2020 11:34 )   PT: 12.8 sec;   INR: 1.15 ratio         PTT - ( 2020 11:34 )  PTT:24.5 sec  CAPILLARY BLOOD GLUCOSE        CARDIAC MARKERS ( 2020 11:34 )  <0.015 ng/mL / x     / x     / x     / x          Urinalysis Basic - ( 2020 17:03 )    Color: Yellow / Appearance: Clear / S.015 / pH: x  Gluc: x / Ketone: Negative  / Bili: Negative / Urobili: Negative mg/dL   Blood: x / Protein: Negative mg/dL / Nitrite: Negative   Leuk Esterase: Negative / RBC: x / WBC x   Sq Epi: x / Non Sq Epi: x / Bacteria: x        RADIOLOGY:    CT Abdomen and Pelvis w/ Oral Cont and w/ IV Cont (20 @ 17:32) >  FINDINGS:  LOWER CHEST: Within normal limits.    LIVER: Single nonspecific rounded hypodensity in the right hepatic lobe at the dome measuring 1.2 cm.  BILE DUCTS: Normal caliber.  GALLBLADDER: Within normal limits.  SPLEEN: Within normal limits.  PANCREAS: Within normal limits.  ADRENALS: Within normal limits.  KIDNEYS/URETERS: Within normal limits.    BLADDER: Within normal limits.  REPRODUCTIVE ORGANS: Prostate within normal limits.    BOWEL: No bowel obstruction. There is a soft tissue mass in the proximal ascending colon justabove the level of the ileocecal valve. This measures 6.2 x 3.5 x 6.0 cm AP and is suspicious for colon carcinoma. Direct visualization is recommended.  PERITONEUM: No ascites.  VESSELS: Within normal limits.  RETROPERITONEUM/LYMPH NODES: No lymphadenopathy.    ABDOMINAL WALL: Midline fat-containing ventral hernia.  BONES: Compression screws are seen in the left hip. There are mild degenerative changes of the spine with mild dextroscoliosis    IMPRESSION:   Large mass ascending colon suspicious for colon carcinoma.  Small hepatic region indeterminate but may represent metastatic disease given the colon mass.  Findings were discussed with the hospitalist Dr. Beltran at 6:10 PM on 2020.       Xray Hip w/ Pelvis 2 or 3 Views, Left (20 @ 12:51) >  INTERPRETATION:  Left hip with pelvis and left femur. Patient has pain after a fall.    Left hip with pelvis. 5 views.    There is hip pinning on the left with good alignment. The original fracture appears healed.    There is some chronic deformity around both sides of the pubic symphysis.    Hips appear free of degeneration.    No bone destruction or acute fracture is evident.    Left femur. 4 views.    There is no knee effusion and slight knee degeneration.    No bone destruction or fracture is evident.    IMPRESSION: Old left hip pinning with good alignment. No acute fracture.    < end of copied text >    < from: TTE Echo Complete w/o Contrast w/ Doppler (20 @ 07:44) >     The left ventricle is normal in size, wall motion and contractility.   Mild concentric left ventricular hypertrophy is present.   Estimated left ventricular ejection fraction is 65-70 %.   Normal appearing left atrium.   Normal appearing right atrium.   Normal appearing right ventricle structure and function.   Normal aortic valve structure and function.   Trace mitral regurgitation is present.   Normal appearing tricuspid valve structure and function.   Normal appearing pulmonic valve structure and function.   No evidence of pericardial effusion.   No evidence of pleural effusion.   All visualized extra cardiac structures appears to be normal.    < end of copied text >

## 2020-06-27 NOTE — PROGRESS NOTE ADULT - SUBJECTIVE AND OBJECTIVE BOX
Patient is a 64y old  Male who presents with a chief complaint of Anemia, Syncope (26 Jun 2020 14:59)      HPI:  Patient is 65yo male without sig PMhx, not on any prescription meds, presents to the hospital with syncopal event. As per the patient, he walked into the kitchen and "blacked out" falling onto his L hip. Pt denies preceding symptoms of CP, SOB, palpitations, HA, dizziness. No other constitutional symptoms. Of note patient last MD 3y ago, and was told everything was OK. Pt endorses an intentional 20lb weight loss in last 2 months. Denies melena, BRBPR, abd pain, N/V/D.   In the ER HH 6.9, 2u prbc ordered, unclear baseline. (22 Jun 2020 14:36)    Patient is POD#2 s/p hemicolectomy. No flatus. Ambulating.       PAST MEDICAL & SURGICAL HISTORY:  No pertinent past medical history      MEDICATIONS  (STANDING):  heparin   Injectable 5000 Unit(s) SubCutaneous every 8 hours  HYDROmorphone PCA (1 mG/mL) 30 milliLiter(s) PCA Continuous PCA Continuous  lactated ringers. 1000 milliLiter(s) (100 mL/Hr) IV Continuous <Continuous>  pantoprazole  Injectable 40 milliGRAM(s) IV Push two times a day    MEDICATIONS  (PRN):  acetaminophen   Tablet .. 650 milliGRAM(s) Oral once PRN Temp greater or equal to 38C (100.4F), Mild Pain (1 - 3)  acetaminophen   Tablet .. 650 milliGRAM(s) Oral every 6 hours PRN Temp greater or equal to 38C (100.4F), Mild Pain (1 - 3)  HYDROmorphone  Injectable 0.5 milliGRAM(s) IV Push every 5 minutes PRN Moderate Pain (4 - 6)  HYDROmorphone PCA (1 mG/mL) Rescue Clinician Bolus 0.5 milliGRAM(s) IV Push every 15 minutes PRN for Pain Scale GREATER THAN 6  naloxone Injectable 0.1 milliGRAM(s) IV Push every 3 minutes PRN For ANY of the following changes in patient status:  A. RR LESS THAN 10 breaths per minute, B. Oxygen saturation LESS THAN 90%, C. Sedation score of 6  ondansetron Injectable 4 milliGRAM(s) IV Push every 6 hours PRN Nausea      Allergies    No Known Allergies    Intolerances          Vital Signs Last 24 Hrs  T(C): 36.9 (27 Jun 2020 05:54), Max: 36.9 (26 Jun 2020 15:27)  T(F): 98.4 (27 Jun 2020 05:54), Max: 98.4 (26 Jun 2020 15:27)  HR: 77 (27 Jun 2020 05:54) (71 - 80)  BP: 157/71 (27 Jun 2020 05:54) (123/68 - 166/76)  BP(mean): 94 (26 Jun 2020 21:45) (94 - 94)  RR: 16 (27 Jun 2020 05:54) (16 - 16)  SpO2: 96% (27 Jun 2020 05:54) (95% - 100%)    PHYSICAL EXAM:      Respiratory: CTAB  Cardiovascular: S1 and S2, RRR, no M/G/R  Gastrointestinal: BS present, nontender    LABS:                        8.2    x     )-----------( x        ( 26 Jun 2020 17:26 )             28.6     06-26    139  |  108  |  14  ----------------------------<  108<H>  4.6   |  25  |  1.04    Ca    8.7      26 Jun 2020 08:35  Phos  3.4     06-26  Mg     1.8     06-26      PT/INR - ( 25 Jun 2020 07:46 )   PT: 13.1 sec;   INR: 1.17 ratio         PTT - ( 25 Jun 2020 07:46 )  PTT:25.2 sec      RADIOLOGY & ADDITIONAL STUDIES:

## 2020-06-28 LAB
ANION GAP SERPL CALC-SCNC: 6 MMOL/L — SIGNIFICANT CHANGE UP (ref 5–17)
BUN SERPL-MCNC: 7 MG/DL — SIGNIFICANT CHANGE UP (ref 7–23)
CALCIUM SERPL-MCNC: 8.5 MG/DL — SIGNIFICANT CHANGE UP (ref 8.5–10.1)
CHLORIDE SERPL-SCNC: 105 MMOL/L — SIGNIFICANT CHANGE UP (ref 96–108)
CO2 SERPL-SCNC: 29 MMOL/L — SIGNIFICANT CHANGE UP (ref 22–31)
CREAT SERPL-MCNC: 0.6 MG/DL — SIGNIFICANT CHANGE UP (ref 0.5–1.3)
GLUCOSE SERPL-MCNC: 105 MG/DL — HIGH (ref 70–99)
HCT VFR BLD CALC: 28.5 % — LOW (ref 39–50)
HGB BLD-MCNC: 8.1 G/DL — LOW (ref 13–17)
MCHC RBC-ENTMCNC: 18.5 PG — LOW (ref 27–34)
MCHC RBC-ENTMCNC: 28.4 GM/DL — LOW (ref 32–36)
MCV RBC AUTO: 65.2 FL — LOW (ref 80–100)
PLATELET # BLD AUTO: 285 K/UL — SIGNIFICANT CHANGE UP (ref 150–400)
POTASSIUM SERPL-MCNC: 3.4 MMOL/L — LOW (ref 3.5–5.3)
POTASSIUM SERPL-SCNC: 3.4 MMOL/L — LOW (ref 3.5–5.3)
RBC # BLD: 4.37 M/UL — SIGNIFICANT CHANGE UP (ref 4.2–5.8)
RBC # FLD: 25.7 % — HIGH (ref 10.3–14.5)
SODIUM SERPL-SCNC: 140 MMOL/L — SIGNIFICANT CHANGE UP (ref 135–145)
WBC # BLD: 5.59 K/UL — SIGNIFICANT CHANGE UP (ref 3.8–10.5)
WBC # FLD AUTO: 5.59 K/UL — SIGNIFICANT CHANGE UP (ref 3.8–10.5)

## 2020-06-28 PROCEDURE — 99232 SBSQ HOSP IP/OBS MODERATE 35: CPT | Mod: GC

## 2020-06-28 PROCEDURE — 99232 SBSQ HOSP IP/OBS MODERATE 35: CPT

## 2020-06-28 RX ORDER — SODIUM CHLORIDE 9 MG/ML
1000 INJECTION, SOLUTION INTRAVENOUS
Refills: 0 | Status: DISCONTINUED | OUTPATIENT
Start: 2020-06-28 | End: 2020-06-28

## 2020-06-28 RX ORDER — SODIUM CHLORIDE 9 MG/ML
1000 INJECTION, SOLUTION INTRAVENOUS
Refills: 0 | Status: DISCONTINUED | OUTPATIENT
Start: 2020-06-28 | End: 2020-06-29

## 2020-06-28 RX ADMIN — HEPARIN SODIUM 5000 UNIT(S): 5000 INJECTION INTRAVENOUS; SUBCUTANEOUS at 22:04

## 2020-06-28 RX ADMIN — SODIUM CHLORIDE 75 MILLILITER(S): 9 INJECTION, SOLUTION INTRAVENOUS at 11:00

## 2020-06-28 RX ADMIN — SODIUM CHLORIDE 75 MILLILITER(S): 9 INJECTION, SOLUTION INTRAVENOUS at 22:50

## 2020-06-28 RX ADMIN — HEPARIN SODIUM 5000 UNIT(S): 5000 INJECTION INTRAVENOUS; SUBCUTANEOUS at 06:12

## 2020-06-28 RX ADMIN — PANTOPRAZOLE SODIUM 40 MILLIGRAM(S): 20 TABLET, DELAYED RELEASE ORAL at 09:56

## 2020-06-28 RX ADMIN — PANTOPRAZOLE SODIUM 40 MILLIGRAM(S): 20 TABLET, DELAYED RELEASE ORAL at 22:04

## 2020-06-28 RX ADMIN — HEPARIN SODIUM 5000 UNIT(S): 5000 INJECTION INTRAVENOUS; SUBCUTANEOUS at 13:47

## 2020-06-28 NOTE — PROGRESS NOTE ADULT - SUBJECTIVE AND OBJECTIVE BOX
Patient is a 64y old  Male who presents with a chief complaint of Anemia, Syncope (28 Jun 2020 06:38)      HPI:  pt feeling ok this am  minimal joseph-incisional pain  passed bm after walking this am    PAST MEDICAL & SURGICAL HISTORY:  No pertinent past medical history    MEDICATIONS  (STANDING):  dextrose 5% + lactated ringers. 1000 milliLiter(s) (75 mL/Hr) IV Continuous <Continuous>  heparin   Injectable 5000 Unit(s) SubCutaneous every 8 hours  pantoprazole  Injectable 40 milliGRAM(s) IV Push two times a day    MEDICATIONS  (PRN):  acetaminophen   Tablet .. 650 milliGRAM(s) Oral once PRN Temp greater or equal to 38C (100.4F), Mild Pain (1 - 3)  acetaminophen   Tablet .. 650 milliGRAM(s) Oral every 6 hours PRN Temp greater or equal to 38C (100.4F), Mild Pain (1 - 3)  morphine  - Injectable 4 milliGRAM(s) IV Push three times a day PRN Severe Pain (7 - 10)  oxyCODONE    IR 5 milliGRAM(s) Oral every 6 hours PRN Moderate Pain (4 - 6)    Allergies  No Known Allergies    REVIEW OF SYSTEMS:    CONSTITUTIONAL: No weakness, fevers or chills  RESPIRATORY: No cough, wheezing, hemoptysis; No shortness of breath  CARDIOVASCULAR: No chest pain or palpitations  GASTROINTESTINAL: as above  All other review of systems is negative unless indicated above.    Vital Signs Last 24 Hrs  T(C): 36.7 (28 Jun 2020 08:57), Max: 37.1 (27 Jun 2020 15:25)  T(F): 98.1 (28 Jun 2020 08:57), Max: 98.8 (27 Jun 2020 15:25)  HR: 71 (28 Jun 2020 08:57) (71 - 81)  BP: 147/73 (28 Jun 2020 08:57) (142/76 - 164/86)  BP(mean): --  RR: 16 (28 Jun 2020 08:57) (16 - 18)  SpO2: 98% (28 Jun 2020 08:57) (98% - 99%)    PHYSICAL EXAM:  Constitutional: NAD  Gastrointestinal: BS+, soft, incision      LABS:                        8.1    5.59  )-----------( 285      ( 28 Jun 2020 06:41 )             28.5     06-28    140  |  105  |  7   ----------------------------<  105<H>  3.4<L>   |  29  |  0.60    Ca    8.5      28 Jun 2020 06:41            RADIOLOGY & ADDITIONAL STUDIES:

## 2020-06-28 NOTE — PROGRESS NOTE ADULT - SUBJECTIVE AND OBJECTIVE BOX
Subjective: POD#3    Objective: R colon    Heent: N/C, AT PER no scleral icterus, No JVD  Pul: clear  Cor: RRR  Abdomen: soft, normal BS. Wound - clean  Extremities: without edema, motor/sensory intact    06-27    140  |  106  |  12  ----------------------------<  81  3.7   |  27  |  0.65    Ca    8.3<L>      27 Jun 2020 06:34  Phos  3.4     06-26  Mg     1.8     06-26                              8.2    8.14  )-----------( 301      ( 27 Jun 2020 06:34 )             29.8

## 2020-06-28 NOTE — PROGRESS NOTE ADULT - SUBJECTIVE AND OBJECTIVE BOX
65yo male without sig PMhx, not on any prescription meds, presents to the hospital with syncopal event. As per the patient, he walked into the kitchen and "blacked out" falling onto his L hip. Pt denies preceding symptoms of CP, SOB, palpitations, HA, dizziness. No other constitutional symptoms. Of note patient last MD 3y ago, and was told everything was OK. Pt endorses an intentional 20lb weight loss in last 2 months. Denies melena, BRBPR, abd pain, N/V/D. S/P 2u pRBC transfused during admission.     6/28: Pt seen and evaluated at bedside. POD #3 from ascending colon mass resection on 6/25. Mild abd pain responding well to meds. Still no flatus. will continue NPO.    REVIEW OF SYSTEMS:  CONSTITUTIONAL: No weakness, fevers or chills  EYES/ENT: No visual changes;  No vertigo or throat pain   NECK: No pain or stiffness  RESPIRATORY: No cough, wheezing, hemoptysis; No shortness of breath  CARDIOVASCULAR: No chest pain or palpitations  GASTROINTESTINAL: POSITIVE mild abdominal pain. No nausea, vomiting, or hematemesis; No diarrhea or constipation. No melena or hematochezia.  GENITOURINARY: No dysuria, frequency or hematuria  NEUROLOGICAL: No numbness or weakness  SKIN: No itching, burning, rashes, or lesions   All other review of systems is negative unless indicated above    PHYSICAL EXAM:  Vital Signs Last 24 Hrs  T(C): 36.7 (28 Jun 2020 08:57), Max: 37.1 (27 Jun 2020 15:25)  T(F): 98.1 (28 Jun 2020 08:57), Max: 98.8 (27 Jun 2020 15:25)  HR: 71 (28 Jun 2020 08:57) (71 - 81)  BP: 147/73 (28 Jun 2020 08:57) (142/76 - 164/86)  BP(mean): --  RR: 16 (28 Jun 2020 08:57) (16 - 18)  SpO2: 98% (28 Jun 2020 08:57) (98% - 99%)    Constitutional: Pt lying in bed, awake and alert, NAD. Cachectic.  HEENT: EOMI, normal hearing, moist mucous membranes  Neck: Soft and supple, no JVD  Respiratory: CTABL, No wheezing, rales or rhonchi  Cardiovascular: S1S2+, RRR, no M/G/R  Gastrointestinal: BS+, soft, NT/ND, no guarding, no rebound. Bandages C/D/I   Extremities: No peripheral edema  Vascular: 2+ peripheral pulses  Neurological: AAOx3, no focal deficits  Musculoskeletal: 5/5 strength b/l upper and lower extremities  Skin: No rashes    MEDICATIONS:  MEDICATIONS  (STANDING):  heparin   Injectable 5000 Unit(s) SubCutaneous every 8 hours  pantoprazole  Injectable 40 milliGRAM(s) IV Push two times a day      LABS: All Labs Reviewed:                        8.1    5.59  )-----------( 285      ( 28 Jun 2020 06:41 )             28.5     06-28    140  |  105  |  7   ----------------------------<  105<H>  3.4<L>   |  29  |  0.60    Ca    8.5      28 Jun 2020 06:41            Blood Culture:   I&O's Summary    27 Jun 2020 07:01  -  28 Jun 2020 07:00  --------------------------------------------------------  IN: 0 mL / OUT: 800 mL / NET: -800 mL    28 Jun 2020 07:01  -  28 Jun 2020 13:30  --------------------------------------------------------  IN: 1000 mL / OUT: 0 mL / NET: 1000 mL      CAPILLARY BLOOD GLUCOSE      POCT Blood Glucose.: 119 mg/dL (28 Jun 2020 12:03)  POCT Blood Glucose.: 123 mg/dL (28 Jun 2020 05:56)  POCT Blood Glucose.: 98 mg/dL (27 Jun 2020 23:41)  POCT Blood Glucose.: 92 mg/dL (27 Jun 2020 19:28)      RADIOLOGY/EKG:    RADIOLOGY:    CT Abdomen and Pelvis w/ Oral Cont and w/ IV Cont (06.22.20 @ 17:32) >  FINDINGS:  LOWER CHEST: Within normal limits.    LIVER: Single nonspecific rounded hypodensity in the right hepatic lobe at the dome measuring 1.2 cm.  BILE DUCTS: Normal caliber.  GALLBLADDER: Within normal limits.  SPLEEN: Within normal limits.  PANCREAS: Within normal limits.  ADRENALS: Within normal limits.  KIDNEYS/URETERS: Within normal limits.    BLADDER: Within normal limits.  REPRODUCTIVE ORGANS: Prostate within normal limits.    BOWEL: No bowel obstruction. There is a soft tissue mass in the proximal ascending colon justabove the level of the ileocecal valve. This measures 6.2 x 3.5 x 6.0 cm AP and is suspicious for colon carcinoma. Direct visualization is recommended.  PERITONEUM: No ascites.  VESSELS: Within normal limits.  RETROPERITONEUM/LYMPH NODES: No lymphadenopathy.    ABDOMINAL WALL: Midline fat-containing ventral hernia.  BONES: Compression screws are seen in the left hip. There are mild degenerative changes of the spine with mild dextroscoliosis    IMPRESSION:   Large mass ascending colon suspicious for colon carcinoma.  Small hepatic region indeterminate but may represent metastatic disease given the colon mass.  Findings were discussed with the hospitalist Dr. Beltran at 6:10 PM on 6/22/2020.       Xray Hip w/ Pelvis 2 or 3 Views, Left (06.22.20 @ 12:51) >  INTERPRETATION:  Left hip with pelvis and left femur. Patient has pain after a fall.    Left hip with pelvis. 5 views.    There is hip pinning on the left with good alignment. The original fracture appears healed.    There is some chronic deformity around both sides of the pubic symphysis.    Hips appear free of degeneration.    No bone destruction or acute fracture is evident.    Left femur. 4 views.    There is no knee effusion and slight knee degeneration.    No bone destruction or fracture is evident.    IMPRESSION: Old left hip pinning with good alignment. No acute fracture.    < end of copied text >    < from: TTE Echo Complete w/o Contrast w/ Doppler (06.23.20 @ 07:44) >     The left ventricle is normal in size, wall motion and contractility.   Mild concentric left ventricular hypertrophy is present.   Estimated left ventricular ejection fraction is 65-70 %.   Normal appearing left atrium.   Normal appearing right atrium.   Normal appearing right ventricle structure and function.   Normal aortic valve structure and function.   Trace mitral regurgitation is present.   Normal appearing tricuspid valve structure and function.   Normal appearing pulmonic valve structure and function.   No evidence of pericardial effusion.   No evidence of pleural effusion.   All visualized extra cardiac structures appears to be normal.    < end of copied text >

## 2020-06-28 NOTE — PROGRESS NOTE ADULT - ASSESSMENT
65 y/o Male  without any significant PMHx came to ED on 6/22/20 for evaluation of a syncopal episode and found with h/h 6.9/26.4 and a CT of the abdomen and pelvis remarkable for a large mass in the ascending colon suspicious for colon cancer.     #Large ascending colonic mass   - CT abdomen showed  a large mass in the ascending colon suspicious for colon carcinoma, small hepatic region indeterminate but may represent metastasis disease.   - He denies hx of melena, hematochezia and fatigue. He endorse 10 pound loss int he last month.  - No family hx of colon cancer   - Gastroenterology recommendations appreciated   - Colonoscopy performed on 6/24 and showed a large friable mass above ileocecal valve  - Colonoscopy pathology showed superficial fragments of villoglandular proliferation with high grade dysplasia.   -  S/P resection of colonic mass on 6/25    -  F/U pathology report  - Liver lesion could be a liver hemangioma   - Continue NPO while no BM or flatus  - Start D5W% 75ml/hr for 12 hrs, then resume RL if pt still NPO   - s/p Hydromorphone PCA   - Continue Morphine 4mg IV TID for severe pain  - Continue Oxycodone 5mg PO every 6 hrs for moderate pain  - Frequent Abdominal exam  - Monitor H/H   - Monitor Vital signs     #Syncope event in the setting of anemia   - At admission H/H 6.9/26.4   - EKG remarkable for RBBB and no ST or T wave inversions  - Troponin negatives   - s/p 2PRBC transfusion   - Today H/H 8.2/29.8  - Cardiology recommendations appreciated   - Monitor vital signs     #Anemia due to blood loss in the setting of suspected malignancy   - At admission H/H 6.9/26.4   - s/p 2PRBC transfusion   - H/H remains stable  - Trend CBC     #Leukocytosis   - Resolved  - Mostly reactive in the setting of recent surgery   - No signs of infection   - Trend CBC     #Left hip pain  - Improving   - Pt reported pain in the left hip that moves to the anterior thigh and knee   - Pain is better than previous days   - X ray of the left hip/femur showed old left hip pinning with good alignment. No acute fracture.   - Continue Lidocaine patch   - Tylenol PRN pain  - PT recommended home or outpatient PT   - Pain mostly secondary to contusion  - Fall precautions    #Transaminitis   - Mild, mostly secondary to possible liver mets    - Avoid hepatotoxic medications   - Trend CMP      #Advance directives   - Full code     #DVT   - Heparin subcutaneus

## 2020-06-29 LAB
ANION GAP SERPL CALC-SCNC: 5 MMOL/L — SIGNIFICANT CHANGE UP (ref 5–17)
BUN SERPL-MCNC: 6 MG/DL — LOW (ref 7–23)
CALCIUM SERPL-MCNC: 8.7 MG/DL — SIGNIFICANT CHANGE UP (ref 8.5–10.1)
CHLORIDE SERPL-SCNC: 106 MMOL/L — SIGNIFICANT CHANGE UP (ref 96–108)
CO2 SERPL-SCNC: 29 MMOL/L — SIGNIFICANT CHANGE UP (ref 22–31)
CREAT SERPL-MCNC: 0.57 MG/DL — SIGNIFICANT CHANGE UP (ref 0.5–1.3)
GLUCOSE SERPL-MCNC: 91 MG/DL — SIGNIFICANT CHANGE UP (ref 70–99)
HCT VFR BLD CALC: 28.3 % — LOW (ref 39–50)
HGB BLD-MCNC: 8.2 G/DL — LOW (ref 13–17)
MCHC RBC-ENTMCNC: 19 PG — LOW (ref 27–34)
MCHC RBC-ENTMCNC: 29 GM/DL — LOW (ref 32–36)
MCV RBC AUTO: 65.5 FL — LOW (ref 80–100)
PLATELET # BLD AUTO: 285 K/UL — SIGNIFICANT CHANGE UP (ref 150–400)
POTASSIUM SERPL-MCNC: 3.3 MMOL/L — LOW (ref 3.5–5.3)
POTASSIUM SERPL-SCNC: 3.3 MMOL/L — LOW (ref 3.5–5.3)
RBC # BLD: 4.32 M/UL — SIGNIFICANT CHANGE UP (ref 4.2–5.8)
RBC # FLD: 25.8 % — HIGH (ref 10.3–14.5)
SODIUM SERPL-SCNC: 140 MMOL/L — SIGNIFICANT CHANGE UP (ref 135–145)
WBC # BLD: 4.89 K/UL — SIGNIFICANT CHANGE UP (ref 3.8–10.5)
WBC # FLD AUTO: 4.89 K/UL — SIGNIFICANT CHANGE UP (ref 3.8–10.5)

## 2020-06-29 PROCEDURE — 99232 SBSQ HOSP IP/OBS MODERATE 35: CPT | Mod: GC

## 2020-06-29 RX ORDER — POTASSIUM CHLORIDE 20 MEQ
40 PACKET (EA) ORAL ONCE
Refills: 0 | Status: COMPLETED | OUTPATIENT
Start: 2020-06-29 | End: 2020-06-29

## 2020-06-29 RX ORDER — POTASSIUM CHLORIDE 20 MEQ
20 PACKET (EA) ORAL ONCE
Refills: 0 | Status: DISCONTINUED | OUTPATIENT
Start: 2020-06-29 | End: 2020-06-29

## 2020-06-29 RX ADMIN — HEPARIN SODIUM 5000 UNIT(S): 5000 INJECTION INTRAVENOUS; SUBCUTANEOUS at 21:16

## 2020-06-29 RX ADMIN — HEPARIN SODIUM 5000 UNIT(S): 5000 INJECTION INTRAVENOUS; SUBCUTANEOUS at 14:43

## 2020-06-29 RX ADMIN — PANTOPRAZOLE SODIUM 40 MILLIGRAM(S): 20 TABLET, DELAYED RELEASE ORAL at 09:59

## 2020-06-29 RX ADMIN — PANTOPRAZOLE SODIUM 40 MILLIGRAM(S): 20 TABLET, DELAYED RELEASE ORAL at 21:16

## 2020-06-29 RX ADMIN — HEPARIN SODIUM 5000 UNIT(S): 5000 INJECTION INTRAVENOUS; SUBCUTANEOUS at 05:25

## 2020-06-29 RX ADMIN — Medication 40 MILLIEQUIVALENT(S): at 09:59

## 2020-06-29 NOTE — CHART NOTE - NSCHARTNOTEFT_GEN_A_CORE
Upon Nutritional Assessment by the Registered Dietitian your patient was determined to meet criteria / has evidence of the following diagnosis/diagnoses:          [ ]  Mild Protein Calorie Malnutrition        [ ]  Moderate Protein Calorie Malnutrition        [ x] Severe Protein Calorie Malnutrition        [ ] Unspecified Protein Calorie Malnutrition        [ ] Underweight / BMI <19        [ ] Morbid Obesity / BMI > 40      Findings as based on:  •  Comprehensive nutrition assessment and consultation  •  Calorie counts (nutrient intake analysis)  •  Food acceptance and intake status from observations by staff  •  Follow up  •  Patient education  •  Intervention secondary to interdisciplinary rounds  •   concerns    Pt meets criteria for severe protein-calorie malnutrition in context of acute on chronic condition.    NFPE reveals moderate muscle wasting (temples, interosseus, thighs, calves,)    moderate/severe muscle wasting (clavicles, shoulders, scapulas.)    Moderate fat wasting (triceps, buccal area.)    PO intake < 50% nutritional needs > > 5 days.              Treatment:    The following diet has been recommended:  Suggest advance diet to Clear Liquids to Full Liquids to Low Fiber,   when medically feasible and as tolerated,   add gelatein bid,    ensure enlive when pt on full liquids.   Record PO intake in EMR after each meal (nursing.)   Encourage PO intake.   Monitor PO intake, tolerance, labs and weight.    PROVIDER Section:     By signing this assessment you are acknowledging and agree with the diagnosis/diagnoses assigned by the Registered Dietitian    Comments:

## 2020-06-29 NOTE — DIETITIAN INITIAL EVALUATION ADULT. - ADD RECOMMEND
Suggest advance diet to Clear Liquids to Full Liquids to Low Fiber, when medically feasible and as tolerated, add gelatein bid,  ensure enlive when pt on full liquids.  Record PO intake in EMR after each meal (nursing.) Encourage PO intake. Monitor PO intake, tolerance, labs and weight.

## 2020-06-29 NOTE — PROGRESS NOTE ADULT - ASSESSMENT
Pt has been seen and examined with FP resident, resident supervised agree with a/p       Patient is a 64y old  Male who presents with a chief complaint of Anemia, Syncope (29 Jun 2020 09:40)          PHYSICAL EXAM:  Vital Signs Last 24 Hrs  T(C): 36.7 (29 Jun 2020 08:40), Max: 37.2 (28 Jun 2020 22:27)  T(F): 98.1 (29 Jun 2020 08:40), Max: 98.9 (28 Jun 2020 22:27)  HR: 70 (29 Jun 2020 08:40) (65 - 85)  BP: 158/81 (29 Jun 2020 08:40) (102/65 - 158/81)  BP(mean): --  RR: 16 (29 Jun 2020 08:40) (16 - 18)  SpO2: 99% (29 Jun 2020 08:40) (99% - 100%)  -[id number]-aeeb, cta  -cvs-s1s2 normal   -p/a-soft,bs+      A/P    #management as per surgical team, replace potassium     #dvt pr     #discharge plan

## 2020-06-29 NOTE — DIETITIAN INITIAL EVALUATION ADULT. - PERTINENT LABORATORY DATA
06-29 Na140 mmol/L Glu 91 mg/dL K+ 3.3 mmol/L<L> Cr  0.57 mg/dL BUN 6 mg/dL<L> Phos n/a   Alb n/a   PAB n/a

## 2020-06-29 NOTE — DIETITIAN INITIAL EVALUATION ADULT. - OTHER INFO
Patient is 65yo male without sig PMhx, not on any prescription meds, presents to the hospital with syncopal event. As per the patient, he walked into the kitchen and "blacked out" falling onto his L hip. Pt denies preceding symptoms of CP, SOB, palpitations, HA, dizziness. No other constitutional symptoms. Of note patient last MD 3y ago, and was told everything was OK. Pt endorses an intentional 20lb weight loss in last 2 months. Denies melena, BRBPR, abd pain, N/V/D.   In the ER HH 6.9, 2u prbc ordered, unclear baseline. Patient is 63yo male without sig PMhx, not on any prescription meds, presents to the hospital with syncopal event. As per the patient, he walked into the kitchen and "blacked out" falling onto his L hip. Pt denies preceding symptoms of CP, SOB, palpitations, HA, dizziness. No other constitutional symptoms. Of note patient last MD 3y ago, and was told everything was OK. Pt endorses an intentional 20lb weight loss in last 2 months. Denies melena, BRBPR, abd pain, N/V/D.   In the ER HH 6.9, 2u prbc ordered, unclear baseline.  Pt did not report that he had lost 20 pounds intentionally to this RD, only claiming to lose "a few pounds."   Pt was oob on interview, suggest new weight.    Pt height is 6'1".  Pt is hungry.  PO intake < 75% nutrient needs > one month.

## 2020-06-29 NOTE — DIETITIAN INITIAL EVALUATION ADULT. - PERTINENT MEDS FT
MEDICATIONS  (STANDING):  heparin   Injectable 5000 Unit(s) SubCutaneous every 8 hours  pantoprazole  Injectable 40 milliGRAM(s) IV Push two times a day  potassium chloride    Tablet ER 20 milliEquivalent(s) Oral once    MEDICATIONS  (PRN):  acetaminophen   Tablet .. 650 milliGRAM(s) Oral once PRN Temp greater or equal to 38C (100.4F), Mild Pain (1 - 3)  acetaminophen   Tablet .. 650 milliGRAM(s) Oral every 6 hours PRN Temp greater or equal to 38C (100.4F), Mild Pain (1 - 3)  morphine  - Injectable 4 milliGRAM(s) IV Push three times a day PRN Severe Pain (7 - 10)  oxyCODONE    IR 5 milliGRAM(s) Oral every 6 hours PRN Moderate Pain (4 - 6)

## 2020-06-29 NOTE — DIETITIAN INITIAL EVALUATION ADULT. - DIET TYPE
add gelatein tid.  Advance diet to full liquids to clear liquids to low fiber when medically feasible, and as tolerated.

## 2020-06-29 NOTE — PROGRESS NOTE ADULT - SUBJECTIVE AND OBJECTIVE BOX
Subjective: POD#4    Objective: R colon    Heent: N/C, AT PER no scleral icterus, No JVD  Pul: clear  Cor: RRR  Abdomen: soft, normal BS. Wound - clean  Extremities: without edema, motor/sensory intact    06-29    140  |  106  |  6<L>  ----------------------------<  91  3.3<L>   |  29  |  0.57    Ca    8.7      29 Jun 2020 06:20                              8.2    4.89  )-----------( 285      ( 29 Jun 2020 06:20 )             28.3

## 2020-06-29 NOTE — DIETITIAN INITIAL EVALUATION ADULT. - ENERGY NEEDS
Ht. 70     "        Wt.    91    kg               BMI   28.7               IBW    75   kg               Pt is at  121   %  IBW Ht. 73    "        Wt.    91    kg               BMI   26               IBW    75   kg               Pt is at  108   %  IBW

## 2020-06-29 NOTE — DIETITIAN INITIAL EVALUATION ADULT. - MALNUTRITION
Pt meets criteria for severe protein-calorie malnutrition in context of acute on chronic condition.  NFPE reveals moderate muscle wasting (temples, interosseus, thighs, calves,)  moderate/severe muscle wasting (clavicles, shoulders, scapulas.)  Moderate fat wasting (triceps, buccal area.)  PO intake < 50% nutritional needs > > 5 days. Pt meets criteria for severe protein-calorie malnutrition in context of chronic disease

## 2020-06-29 NOTE — PROGRESS NOTE ADULT - SUBJECTIVE AND OBJECTIVE BOX
65yo male without sig PMhx, not on any prescription meds, presents to the hospital with syncopal event. As per the patient, he walked into the kitchen and "blacked out" falling onto his L hip. Pt denies preceding symptoms of CP, SOB, palpitations, HA, dizziness. No other constitutional symptoms. Of note patient last MD 3y ago, and was told everything was OK. Pt endorses an intentional 20lb weight loss in last 2 months. Denies melena, BRBPR, abd pain, N/V/D. S/P 2u pRBC transfused during admission.     SUBJECTIVE:  Pt is evaluated at bedside and found NAD and in no acute stress. He is POD#4 s/p resection of ascending colon mass. He reports is passing flatus since yesterday but denies any recent bowel movement. He states abdominal pain is well control. Denies fever, chills, nausea, vomits, SOB, chest pain and any other symptoms .     PHYSICAL EXAM:  Vital Signs Last 24 Hrs  T(C): 36.7 (28 Jun 2020 08:57), Max: 37.1 (27 Jun 2020 15:25)  T(F): 98.1 (28 Jun 2020 08:57), Max: 98.8 (27 Jun 2020 15:25)  HR: 71 (28 Jun 2020 08:57) (71 - 81)  BP: 147/73 (28 Jun 2020 08:57) (142/76 - 164/86)  BP(mean): --  RR: 16 (28 Jun 2020 08:57) (16 - 18)  SpO2: 98% (28 Jun 2020 08:57) (98% - 99%)    Constitutional: Pt lying in bed, awake and alert, NAD  HEENT: EOMI, normal hearing, moist mucous membranes  Neck: Soft and supple, no JVD  Respiratory: CTABL, No wheezing, rales or rhonchi  Cardiovascular: S1S2+, RRR, no M/G/R  Gastrointestinal: BS+, soft, NT/ND, no guarding, no rebound. Wound healing well  w/o erythema or discharge from it   Extremities: No peripheral edema  Vascular: 2+ peripheral pulses  Neurological: AAOx3, no focal deficits  Musculoskeletal: 5/5 strength b/l upper and lower extremities  Skin: No rashes    MEDICATIONS:  MEDICATIONS  (STANDING):  heparin   Injectable 5000 Unit(s) SubCutaneous every 8 hours  pantoprazole  Injectable 40 milliGRAM(s) IV Push two times a day      LABS: All Labs Reviewed:                        8.1    5.59  )-----------( 285      ( 28 Jun 2020 06:41 )             28.5     06-28    140  |  105  |  7   ----------------------------<  105<H>  3.4<L>   |  29  |  0.60    Ca    8.5      28 Jun 2020 06:41            Blood Culture:   I&O's Summary    27 Jun 2020 07:01  -  28 Jun 2020 07:00  --------------------------------------------------------  IN: 0 mL / OUT: 800 mL / NET: -800 mL    28 Jun 2020 07:01  -  28 Jun 2020 13:30  --------------------------------------------------------  IN: 1000 mL / OUT: 0 mL / NET: 1000 mL      CAPILLARY BLOOD GLUCOSE      POCT Blood Glucose.: 119 mg/dL (28 Jun 2020 12:03)  POCT Blood Glucose.: 123 mg/dL (28 Jun 2020 05:56)  POCT Blood Glucose.: 98 mg/dL (27 Jun 2020 23:41)  POCT Blood Glucose.: 92 mg/dL (27 Jun 2020 19:28)      RADIOLOGY/EKG:    RADIOLOGY:    CT Abdomen and Pelvis w/ Oral Cont and w/ IV Cont (06.22.20 @ 17:32) >  FINDINGS:  LOWER CHEST: Within normal limits.    LIVER: Single nonspecific rounded hypodensity in the right hepatic lobe at the dome measuring 1.2 cm.  BILE DUCTS: Normal caliber.  GALLBLADDER: Within normal limits.  SPLEEN: Within normal limits.  PANCREAS: Within normal limits.  ADRENALS: Within normal limits.  KIDNEYS/URETERS: Within normal limits.    BLADDER: Within normal limits.  REPRODUCTIVE ORGANS: Prostate within normal limits.    BOWEL: No bowel obstruction. There is a soft tissue mass in the proximal ascending colon justabove the level of the ileocecal valve. This measures 6.2 x 3.5 x 6.0 cm AP and is suspicious for colon carcinoma. Direct visualization is recommended.  PERITONEUM: No ascites.  VESSELS: Within normal limits.  RETROPERITONEUM/LYMPH NODES: No lymphadenopathy.    ABDOMINAL WALL: Midline fat-containing ventral hernia.  BONES: Compression screws are seen in the left hip. There are mild degenerative changes of the spine with mild dextroscoliosis    IMPRESSION:   Large mass ascending colon suspicious for colon carcinoma.  Small hepatic region indeterminate but may represent metastatic disease given the colon mass.  Findings were discussed with the hospitalist Dr. Beltran at 6:10 PM on 6/22/2020.       Xray Hip w/ Pelvis 2 or 3 Views, Left (06.22.20 @ 12:51) >  INTERPRETATION:  Left hip with pelvis and left femur. Patient has pain after a fall.    Left hip with pelvis. 5 views.    There is hip pinning on the left with good alignment. The original fracture appears healed.    There is some chronic deformity around both sides of the pubic symphysis.    Hips appear free of degeneration.    No bone destruction or acute fracture is evident.    Left femur. 4 views.    There is no knee effusion and slight knee degeneration.    No bone destruction or fracture is evident.    IMPRESSION: Old left hip pinning with good alignment. No acute fracture.    < end of copied text >    < from: TTE Echo Complete w/o Contrast w/ Doppler (06.23.20 @ 07:44) >     The left ventricle is normal in size, wall motion and contractility.   Mild concentric left ventricular hypertrophy is present.   Estimated left ventricular ejection fraction is 65-70 %.   Normal appearing left atrium.   Normal appearing right atrium.   Normal appearing right ventricle structure and function.   Normal aortic valve structure and function.   Trace mitral regurgitation is present.   Normal appearing tricuspid valve structure and function.   Normal appearing pulmonic valve structure and function.   No evidence of pericardial effusion.   No evidence of pleural effusion.   All visualized extra cardiac structures appears to be normal.    < end of copied text >

## 2020-06-30 ENCOUNTER — TRANSCRIPTION ENCOUNTER (OUTPATIENT)
Age: 65
End: 2020-06-30

## 2020-06-30 VITALS
HEART RATE: 81 BPM | RESPIRATION RATE: 18 BRPM | OXYGEN SATURATION: 100 % | DIASTOLIC BLOOD PRESSURE: 89 MMHG | TEMPERATURE: 98 F | SYSTOLIC BLOOD PRESSURE: 154 MMHG

## 2020-06-30 LAB
ANION GAP SERPL CALC-SCNC: 6 MMOL/L — SIGNIFICANT CHANGE UP (ref 5–17)
BUN SERPL-MCNC: 9 MG/DL — SIGNIFICANT CHANGE UP (ref 7–23)
CALCIUM SERPL-MCNC: 9 MG/DL — SIGNIFICANT CHANGE UP (ref 8.5–10.1)
CHLORIDE SERPL-SCNC: 106 MMOL/L — SIGNIFICANT CHANGE UP (ref 96–108)
CO2 SERPL-SCNC: 29 MMOL/L — SIGNIFICANT CHANGE UP (ref 22–31)
CREAT SERPL-MCNC: 0.76 MG/DL — SIGNIFICANT CHANGE UP (ref 0.5–1.3)
GLUCOSE SERPL-MCNC: 110 MG/DL — HIGH (ref 70–99)
POTASSIUM SERPL-MCNC: 3.5 MMOL/L — SIGNIFICANT CHANGE UP (ref 3.5–5.3)
POTASSIUM SERPL-SCNC: 3.5 MMOL/L — SIGNIFICANT CHANGE UP (ref 3.5–5.3)
SODIUM SERPL-SCNC: 141 MMOL/L — SIGNIFICANT CHANGE UP (ref 135–145)

## 2020-06-30 PROCEDURE — 99239 HOSP IP/OBS DSCHRG MGMT >30: CPT | Mod: GC

## 2020-06-30 RX ORDER — LIDOCAINE 4 G/100G
1 CREAM TOPICAL
Qty: 7 | Refills: 0
Start: 2020-06-30 | End: 2020-07-06

## 2020-06-30 RX ORDER — LIDOCAINE 4 G/100G
1 CREAM TOPICAL EVERY 24 HOURS
Refills: 0 | Status: DISCONTINUED | OUTPATIENT
Start: 2020-06-30 | End: 2020-06-30

## 2020-06-30 RX ADMIN — HEPARIN SODIUM 5000 UNIT(S): 5000 INJECTION INTRAVENOUS; SUBCUTANEOUS at 05:42

## 2020-06-30 NOTE — DISCHARGE NOTE PROVIDER - HOSPITAL COURSE
Admitted with syncope. Found to have R colon tumor. Urgent R hemicolectomy performed. Recovered nicely. 65yo male without sig PMhx came to ED on 6/22/20 after a syncopal event at home. He reported was walked into the kitchen and "blacked out" and fell onto his L hip. Pt denied  preceding symptoms of CP, SOB, palpitations, HA and  dizziness. At arrival to ED patient found with an H/H 6/9/26.4 and received transfusion of two PRBCs. A CT abdomen and pelvis performed and showed a large mass in the ascending colon suspicious for colon carcinoma and a small hepatic region indeterminate but may represent a liver hemangioma vs metastasis disease. Gastroenterology consulted and recommended to proceed with a colonoscopy for further evaluation. A colonoscopy was performed on 6/24/20 and showed a large friable mass above ileocecal valve and biopsy was taken. Colonoscopy pathology showed superficial fragments of villoglandular proliferation with high grade dysplasia. Surgery consulted and  patient had a right hemicolectomy with resection of the colonic mass on 6/25/20 and tolerated procedure well. Pt initially received pain management     with hydromorphone PCA and was transitioned to Morphine IV PRN. During hospital course pain was well controlled, patient passing flatus and tolerating PO. He have remained hemodynamically stable, afebrile and will be discharge home today 6/30/20.         SUBJECTIVE:     Pt si evaluated at bedside and found NAD an in no acute distress. He states is passing flatus and tolerating PO. He denies abdominal pain, nausea , vomits, fever and any other symptoms.         Vital Signs Last 24 Hrs    T(C): 36.9 (30 Jun 2020 08:37), Max: 36.9 (30 Jun 2020 08:37)    T(F): 98.4 (30 Jun 2020 08:37), Max: 98.4 (30 Jun 2020 08:37)    HR: 81 (30 Jun 2020 08:37) (77 - 81)    BP: 154/89 (30 Jun 2020 08:37) (137/77 - 154/89)    BP(mean): --    RR: 18 (30 Jun 2020 08:37) (16 - 18)    SpO2: 100% (30 Jun 2020 08:37) (99% - 100%)        Constitutional: Pt lying in bed, awake and alert, NAD    HEENT: EOMI, normal hearing, moist mucous membranes    Neck: Soft and supple, no JVD    Respiratory: CTABL, No wheezing, rales or rhonchi    Cardiovascular: S1S2+, RRR, no M/G/R    Gastrointestinal: BS+, soft, NT/ND, no guarding, no rebound. Wound healing well  w/o erythema or discharge from it     Extremities: No peripheral edema    Vascular: 2+ peripheral pulses    Neurological: AAOx3, no focal deficits    Musculoskeletal: 5/5 strength b/l upper and lower extremities    Skin: No rashes        RADIOLOGY:        CT Abdomen and Pelvis w/ Oral Cont and w/ IV Cont (06.22.20 @ 17:32) >    FINDINGS:    LOWER CHEST: Within normal limits.        LIVER: Single nonspecific rounded hypodensity in the right hepatic lobe at the dome measuring 1.2 cm.    BILE DUCTS: Normal caliber.    GALLBLADDER: Within normal limits.    SPLEEN: Within normal limits.    PANCREAS: Within normal limits.    ADRENALS: Within normal limits.    KIDNEYS/URETERS: Within normal limits.        BLADDER: Within normal limits.    REPRODUCTIVE ORGANS: Prostate within normal limits.        BOWEL: No bowel obstruction. There is a soft tissue mass in the proximal ascending colon justabove the level of the ileocecal valve. This measures 6.2 x 3.5 x 6.0 cm AP and is suspicious for colon carcinoma. Direct visualization is recommended.    PERITONEUM: No ascites.    VESSELS: Within normal limits.    RETROPERITONEUM/LYMPH NODES: No lymphadenopathy.      ABDOMINAL WALL: Midline fat-containing ventral hernia.    BONES: Compression screws are seen in the left hip. There are mild degenerative changes of the spine with mild dextroscoliosis        IMPRESSION:     Large mass ascending colon suspicious for colon carcinoma.    Small hepatic region indeterminate but may represent metastatic disease given the colon mass.    Findings were discussed with the hospitalist Dr. Beltran at 6:10 PM on 6/22/2020.             Xray Hip w/ Pelvis 2 or 3 Views, Left (06.22.20 @ 12:51) >            IMPRESSION: Old left hip pinning with good alignment. No acute fracture.            < from: TTE Echo Complete w/o Contrast w/ Doppler (06.23.20 @ 07:44) >         The left ventricle is normal in size, wall motion and contractility.     Mild concentric left ventricular hypertrophy is present.     Estimated left ventricular ejection fraction is 65-70 %.     Normal appearing left atrium.     Normal appearing right atrium.     Normal appearing right ventricle structure and function.     Normal aortic valve structure and function.     Trace mitral regurgitation is present.     Normal appearing tricuspid valve structure and function.     Normal appearing pulmonic valve structure and function.

## 2020-06-30 NOTE — DISCHARGE NOTE PROVIDER - CARE PROVIDERS DIRECT ADDRESSES
[email or web address]

## 2020-06-30 NOTE — PROGRESS NOTE ADULT - SUBJECTIVE AND OBJECTIVE BOX
Subjective: POD#5    Objective: R colon    Heent: N/C, AT PER no scleral icterus, No JVD  Pul: clear  Cor: RRR  Abdomen: soft, normal BS. Wound - clean  Extremities: without edema, motor/sensory intact    06-29    140  |  106  |  6<L>  ----------------------------<  91  3.3<L>   |  29  |  0.57    Ca    8.7      29 Jun 2020 06:20                              8.2    4.89  )-----------( 285      ( 29 Jun 2020 06:20 )             28.3

## 2020-06-30 NOTE — PROGRESS NOTE ADULT - ASSESSMENT
Pt has been seen and examined with FP resident, resident supervised agree with a/p       Patient is a 64y old  Male who presents with a chief complaint of Anemia, Syncope (29 Jun 2020 09:40)          PHYSICAL EXAM:    -rs-aeeb, cta  -cvs-s1s2 normal   -p/a-soft,bs+      A/P    #d/c today, time spent 45 minutes

## 2020-06-30 NOTE — PROGRESS NOTE ADULT - REASON FOR ADMISSION
Anemia, Syncope

## 2020-06-30 NOTE — PROGRESS NOTE ADULT - ASSESSMENT
"PATIENT TO CONTINUE TO FOLLOW UP WITH HIS/HER PRIMARY CARE PROVIDER FOR YEARLY PHYSICAL EXAMS TO ENSURE COMPLETE HEALTH MAINTENANCE      DASH Eating Plan  DASH stands for \"Dietary Approaches to Stop Hypertension.\" The DASH eating plan is a healthy eating plan that has been shown to reduce high blood pressure (hypertension). It may also reduce your risk for type 2 diabetes, heart disease, and stroke. The DASH eating plan may also help with weight loss.  What are tips for following this plan?    General guidelines  · Avoid eating more than 2,300 mg (milligrams) of salt (sodium) a day. If you have hypertension, you may need to reduce your sodium intake to 1,500 mg a day.  · Limit alcohol intake to no more than 1 drink a day for nonpregnant women and 2 drinks a day for men. One drink equals 12 oz of beer, 5 oz of wine, or 1½ oz of hard liquor.  · Work with your health care provider to maintain a healthy body weight or to lose weight. Ask what an ideal weight is for you.  · Get at least 30 minutes of exercise that causes your heart to beat faster (aerobic exercise) most days of the week. Activities may include walking, swimming, or biking.  · Work with your health care provider or diet and nutrition specialist (dietitian) to adjust your eating plan to your individual calorie needs.  Reading food labels    · Check food labels for the amount of sodium per serving. Choose foods with less than 5 percent of the Daily Value of sodium. Generally, foods with less than 300 mg of sodium per serving fit into this eating plan.  · To find whole grains, look for the word \"whole\" as the first word in the ingredient list.  Shopping  · Buy products labeled as \"low-sodium\" or \"no salt added.\"  · Buy fresh foods. Avoid canned foods and premade or frozen meals.  Cooking  · Avoid adding salt when cooking. Use salt-free seasonings or herbs instead of table salt or sea salt. Check with your health care provider or pharmacist before using salt " substitutes.  · Do not rodriguez foods. Cook foods using healthy methods such as baking, boiling, grilling, and broiling instead.  · Cook with heart-healthy oils, such as olive, canola, soybean, or sunflower oil.  Meal planning  · Eat a balanced diet that includes:  ? 5 or more servings of fruits and vegetables each day. At each meal, try to fill half of your plate with fruits and vegetables.  ? Up to 6-8 servings of whole grains each day.  ? Less than 6 oz of lean meat, poultry, or fish each day. A 3-oz serving of meat is about the same size as a deck of cards. One egg equals 1 oz.  ? 2 servings of low-fat dairy each day.  ? A serving of nuts, seeds, or beans 5 times each week.  ? Heart-healthy fats. Healthy fats called Omega-3 fatty acids are found in foods such as flaxseeds and coldwater fish, like sardines, salmon, and mackerel.  · Limit how much you eat of the following:  ? Canned or prepackaged foods.  ? Food that is high in trans fat, such as fried foods.  ? Food that is high in saturated fat, such as fatty meat.  ? Sweets, desserts, sugary drinks, and other foods with added sugar.  ? Full-fat dairy products.  · Do not salt foods before eating.  · Try to eat at least 2 vegetarian meals each week.  · Eat more home-cooked food and less restaurant, buffet, and fast food.  · When eating at a restaurant, ask that your food be prepared with less salt or no salt, if possible.  What foods are recommended?  The items listed may not be a complete list. Talk with your dietitian about what dietary choices are best for you.  Grains  Whole-grain or whole-wheat bread. Whole-grain or whole-wheat pasta. Brown rice. Oatmeal. Quinoa. Bulgur. Whole-grain and low-sodium cereals. Kate bread. Low-fat, low-sodium crackers. Whole-wheat flour tortillas.  Vegetables  Fresh or frozen vegetables (raw, steamed, roasted, or grilled). Low-sodium or reduced-sodium tomato and vegetable juice. Low-sodium or reduced-sodium tomato sauce and tomato  Home. F/U in 1 week. Path pending. paste. Low-sodium or reduced-sodium canned vegetables.  Fruits  All fresh, dried, or frozen fruit. Canned fruit in natural juice (without added sugar).  Meat and other protein foods  Skinless chicken or turkey. Ground chicken or turkey. Pork with fat trimmed off. Fish and seafood. Egg whites. Dried beans, peas, or lentils. Unsalted nuts, nut butters, and seeds. Unsalted canned beans. Lean cuts of beef with fat trimmed off. Low-sodium, lean deli meat.  Dairy  Low-fat (1%) or fat-free (skim) milk. Fat-free, low-fat, or reduced-fat cheeses. Nonfat, low-sodium ricotta or cottage cheese. Low-fat or nonfat yogurt. Low-fat, low-sodium cheese.  Fats and oils  Soft margarine without trans fats. Vegetable oil. Low-fat, reduced-fat, or light mayonnaise and salad dressings (reduced-sodium). Canola, safflower, olive, soybean, and sunflower oils. Avocado.  Seasoning and other foods  Herbs. Spices. Seasoning mixes without salt. Unsalted popcorn and pretzels. Fat-free sweets.  What foods are not recommended?  The items listed may not be a complete list. Talk with your dietitian about what dietary choices are best for you.  Grains  Baked goods made with fat, such as croissants, muffins, or some breads. Dry pasta or rice meal packs.  Vegetables  Creamed or fried vegetables. Vegetables in a cheese sauce. Regular canned vegetables (not low-sodium or reduced-sodium). Regular canned tomato sauce and paste (not low-sodium or reduced-sodium). Regular tomato and vegetable juice (not low-sodium or reduced-sodium). Pickles. Olives.  Fruits  Canned fruit in a light or heavy syrup. Fried fruit. Fruit in cream or butter sauce.  Meat and other protein foods  Fatty cuts of meat. Ribs. Fried meat. Harris. Sausage. Bologna and other processed lunch meats. Salami. Fatback. Hotdogs. Bratwurst. Salted nuts and seeds. Canned beans with added salt. Canned or smoked fish. Whole eggs or egg yolks. Chicken or turkey with skin.  Dairy  Whole or 2% milk,  cream, and half-and-half. Whole or full-fat cream cheese. Whole-fat or sweetened yogurt. Full-fat cheese. Nondairy creamers. Whipped toppings. Processed cheese and cheese spreads.  Fats and oils  Butter. Stick margarine. Lard. Shortening. Ghee. Harris fat. Tropical oils, such as coconut, palm kernel, or palm oil.  Seasoning and other foods  Salted popcorn and pretzels. Onion salt, garlic salt, seasoned salt, table salt, and sea salt. Worcestershire sauce. Tartar sauce. Barbecue sauce. Teriyaki sauce. Soy sauce, including reduced-sodium. Steak sauce. Canned and packaged gravies. Fish sauce. Oyster sauce. Cocktail sauce. Horseradish that you find on the shelf. Ketchup. Mustard. Meat flavorings and tenderizers. Bouillon cubes. Hot sauce and Tabasco sauce. Premade or packaged marinades. Premade or packaged taco seasonings. Relishes. Regular salad dressings.  Where to find more information:  · National Heart, Lung, and Blood [place]: www.nhlbi.nih.gov  · American Heart Association: www.heart.org  Summary  · The DASH eating plan is a healthy eating plan that has been shown to reduce high blood pressure (hypertension). It may also reduce your risk for type 2 diabetes, heart disease, and stroke.  · With the DASH eating plan, you should limit salt (sodium) intake to 2,300 mg a day. If you have hypertension, you may need to reduce your sodium intake to 1,500 mg a day.  · When on the DASH eating plan, aim to eat more fresh fruits and vegetables, whole grains, lean proteins, low-fat dairy, and heart-healthy fats.  · Work with your health care provider or diet and nutrition specialist (dietitian) to adjust your eating plan to your individual calorie needs.  This information is not intended to replace advice given to you by your health care provider. Make sure you discuss any questions you have with your health care provider.  Document Released: 12/06/2012 Document Revised: 12/11/2017 Document Reviewed: 12/11/2017  Srikanth  Interactive Patient Education © 2020 [employer]

## 2020-06-30 NOTE — DISCHARGE NOTE PROVIDER - CARE PROVIDER_API CALL
Denny Bobby  SURGERY  [address] [place] [address]  Phone: [phone]  Fax: [phone]  Follow Up Time:

## 2020-06-30 NOTE — DISCHARGE NOTE NURSING/CASE MANAGEMENT/SOCIAL WORK - PATIENT PORTAL LINK FT
You can access the FollowMyHealth Patient Portal offered by [facility] by registering at the following website: [email or web address]. By joining [employer]’s FollowMyHealth portal, you will also be able to view your health information using other applications (apps) compatible with our system.

## 2020-06-30 NOTE — DISCHARGE NOTE PROVIDER - NSDCCPCAREPLAN_GEN_ALL_CORE_FT
PRINCIPAL DISCHARGE DIAGNOSIS  Diagnosis: Syncope  Assessment and Plan of Treatment:       SECONDARY DISCHARGE DIAGNOSES  Diagnosis: Anemia  Assessment and Plan of Treatment: PRINCIPAL DISCHARGE DIAGNOSIS  Diagnosis: Colonic mass  Assessment and Plan of Treatment: You came to the emergency room after you fainted at home. You were found with low hemoglobin and CT of the abdomen was performed for further evaluation. CT abdomen showed a colonic mass that was resected on 6/25/20. You are tolerating food and passing flatus and will be discharge home.  - Follow up with Dr Bobby in 1 week  - Follow up  pathology results   - Return to the emergency room if develop nausea, vomits, abdominal pain, fever or cills.      SECONDARY DISCHARGE DIAGNOSES  Diagnosis: Syncope  Assessment and Plan of Treatment: You came after your fainted at home.This event mostly secondary to anemia in the setting of colonic  mass.    Diagnosis: Anemia  Assessment and Plan of Treatment: You were found with low hemoglobin and received two blood transfusion. Anemia mostly secondary to colonic mass. Follow up with you primary  care doctor and repeat CBC in 1 week . PRINCIPAL DISCHARGE DIAGNOSIS  Diagnosis: Colonic mass  Assessment and Plan of Treatment: You came to the emergency room after you fainted at home. You were found with low hemoglobin and CT of the abdomen was performed for further evaluation. CT abdomen showed a colonic mass that was resected on 6/25/20. You are tolerating food and passing flatus and will be discharge home.  - Follow up with Dr Bobby in 1 week  - Follow up  pathology results   - Return to the emergency room if develop nausea, vomits, abdominal pain, fever or cills.      SECONDARY DISCHARGE DIAGNOSES  Diagnosis: High blood pressure  Assessment and Plan of Treatment: You were found with elevated blood pressure. It could be secondary to pain after surgery. You need to follow up with primary care  doctor in 1-2 days for blood pressure check and further management.    Diagnosis: Syncope  Assessment and Plan of Treatment: You came after your fainted at home.This event mostly secondary to anemia in the setting of colonic  mass.    Diagnosis: Anemia  Assessment and Plan of Treatment: You were found with low hemoglobin and received two blood transfusion. Anemia mostly secondary to colonic mass. Follow up with you primary  care doctor and repeat CBC in 1 week .

## 2020-07-06 DIAGNOSIS — E87.6 HYPOKALEMIA: ICD-10-CM

## 2020-07-06 DIAGNOSIS — D63.0 ANEMIA IN NEOPLASTIC DISEASE: ICD-10-CM

## 2020-07-06 DIAGNOSIS — C18.2 MALIGNANT NEOPLASM OF ASCENDING COLON: ICD-10-CM

## 2020-07-06 DIAGNOSIS — D62 ACUTE POSTHEMORRHAGIC ANEMIA: ICD-10-CM

## 2020-07-06 DIAGNOSIS — K64.9 UNSPECIFIED HEMORRHOIDS: ICD-10-CM

## 2020-07-06 DIAGNOSIS — C78.7 SECONDARY MALIGNANT NEOPLASM OF LIVER AND INTRAHEPATIC BILE DUCT: ICD-10-CM

## 2020-07-06 DIAGNOSIS — E43 UNSPECIFIED SEVERE PROTEIN-CALORIE MALNUTRITION: ICD-10-CM

## 2020-07-06 DIAGNOSIS — R55 SYNCOPE AND COLLAPSE: ICD-10-CM

## 2020-10-30 PROBLEM — Z78.9 OTHER SPECIFIED HEALTH STATUS: Chronic | Status: ACTIVE | Noted: 2020-06-22

## 2020-11-02 ENCOUNTER — APPOINTMENT (OUTPATIENT)
Dept: GASTROENTEROLOGY | Facility: CLINIC | Age: 65
End: 2020-11-02
Payer: COMMERCIAL

## 2020-11-02 VITALS
BODY MASS INDEX: 20.75 KG/M2 | HEART RATE: 86 BPM | SYSTOLIC BLOOD PRESSURE: 121 MMHG | WEIGHT: 160 LBS | DIASTOLIC BLOOD PRESSURE: 77 MMHG | HEIGHT: 73.5 IN

## 2020-11-02 DIAGNOSIS — R10.84 GENERALIZED ABDOMINAL PAIN: ICD-10-CM

## 2020-11-02 DIAGNOSIS — R11.2 NAUSEA WITH VOMITING, UNSPECIFIED: ICD-10-CM

## 2020-11-02 DIAGNOSIS — Z85.038 PERSONAL HISTORY OF OTHER MALIGNANT NEOPLASM OF LARGE INTESTINE: ICD-10-CM

## 2020-11-02 DIAGNOSIS — R19.7 DIARRHEA, UNSPECIFIED: ICD-10-CM

## 2020-11-02 PROCEDURE — 99215 OFFICE O/P EST HI 40 MIN: CPT

## 2020-11-02 PROCEDURE — 99072 ADDL SUPL MATRL&STAF TM PHE: CPT

## 2020-11-03 ENCOUNTER — TRANSCRIPTION ENCOUNTER (OUTPATIENT)
Age: 65
End: 2020-11-03

## 2020-11-03 NOTE — ASSESSMENT
[FreeTextEntry1] : 63yo male with recent surgery for colon cancer now with n/v/d 2 weeks after chemo\par \par unclear if residual chemo effect vs partial obstruction vs other inflammatory or infectious process\par will check labs, ct scan\par d/w pt in detail

## 2020-11-03 NOTE — HISTORY OF PRESENT ILLNESS
[de-identified] : 63yo male with colon cancer now with abdominal pain, n/v/d\par \par He underwent surgery 6/25 and did well for months. He received 2 doses of chemo last about 2 weeks ago and stopped due to severe gi symptoms. He has persistent diffuse abdominal pain, with distention nausea with vomiting and diarrhea

## 2020-11-03 NOTE — PHYSICAL EXAM
[General Appearance - Alert] : alert [General Appearance - In No Acute Distress] : in no acute distress [Neck Appearance] : the appearance of the neck was normal [Neck Cervical Mass (___cm)] : no neck mass was observed [Jugular Venous Distention Increased] : there was no jugular-venous distention [Thyroid Diffuse Enlargement] : the thyroid was not enlarged [Thyroid Nodule] : there were no palpable thyroid nodules [Auscultation Breath Sounds / Voice Sounds] : lungs were clear to auscultation bilaterally [Heart Rate And Rhythm] : heart rate was normal and rhythm regular [Heart Sounds] : normal S1 and S2 [Heart Sounds Gallop] : no gallops [Murmurs] : no murmurs [Heart Sounds Pericardial Friction Rub] : no pericardial rub [] : no hepato-splenomegaly [Abdomen Mass (___ Cm)] : no abdominal mass palpated [Abnormal Walk] : normal gait [Nail Clubbing] : no clubbing  or cyanosis of the fingernails [Musculoskeletal - Swelling] : no joint swelling seen [Motor Tone] : muscle strength and tone were normal [Oriented To Time, Place, And Person] : oriented to person, place, and time [Impaired Insight] : insight and judgment were intact [Affect] : the affect was normal [FreeTextEntry1] : softly distended with diffuse tenderness

## 2020-11-03 NOTE — REVIEW OF SYSTEMS
[Feeling Poorly] : feeling poorly [Feeling Tired] : feeling tired [Recent Weight Loss (___ Lbs)] : recent [unfilled] ~Ulb weight loss [As Noted in HPI] : as noted in HPI [Abdominal Pain] : abdominal pain [Vomiting] : vomiting [Diarrhea] : diarrhea [Negative] : Heme/Lymph

## 2020-11-04 LAB
ALBUMIN SERPL ELPH-MCNC: 3.5 G/DL
ALP BLD-CCNC: 79 U/L
ALT SERPL-CCNC: 15 U/L
ANION GAP SERPL CALC-SCNC: 13 MMOL/L
AST SERPL-CCNC: 23 U/L
BASOPHILS # BLD AUTO: 0.06 K/UL
BASOPHILS NFR BLD AUTO: 0.5 %
BILIRUB SERPL-MCNC: 0.2 MG/DL
BUN SERPL-MCNC: 12 MG/DL
CALCIUM SERPL-MCNC: 9.1 MG/DL
CHLORIDE SERPL-SCNC: 101 MMOL/L
CO2 SERPL-SCNC: 25 MMOL/L
CREAT SERPL-MCNC: 0.85 MG/DL
CRP SERPL-MCNC: 3.66 MG/DL
EOSINOPHIL # BLD AUTO: 0.07 K/UL
EOSINOPHIL NFR BLD AUTO: 0.6 %
GLUCOSE SERPL-MCNC: 87 MG/DL
HCT VFR BLD CALC: 40.4 %
HGB BLD-MCNC: 12.6 G/DL
IMM GRANULOCYTES NFR BLD AUTO: 0.3 %
LYMPHOCYTES # BLD AUTO: 1.76 K/UL
LYMPHOCYTES NFR BLD AUTO: 15.7 %
MAN DIFF?: NORMAL
MCHC RBC-ENTMCNC: 26.8 PG
MCHC RBC-ENTMCNC: 31.2 GM/DL
MCV RBC AUTO: 85.8 FL
MONOCYTES # BLD AUTO: 0.95 K/UL
MONOCYTES NFR BLD AUTO: 8.5 %
NEUTROPHILS # BLD AUTO: 8.32 K/UL
NEUTROPHILS NFR BLD AUTO: 74.4 %
PLATELET # BLD AUTO: 297 K/UL
POTASSIUM SERPL-SCNC: 4.6 MMOL/L
PROT SERPL-MCNC: 6.1 G/DL
RBC # BLD: 4.71 M/UL
RBC # FLD: 23.2 %
SODIUM SERPL-SCNC: 139 MMOL/L
TSH SERPL-ACNC: 1.6 UIU/ML
WBC # FLD AUTO: 11.19 K/UL

## 2020-11-05 DIAGNOSIS — A04.72 ENTEROCOLITIS DUE TO CLOSTRIDIUM DIFFICILE, NOT SPECIFIED AS RECURRENT: ICD-10-CM

## 2020-11-05 LAB
C DIFF TOX GENS STL QL NAA+PROBE: NORMAL
CDIFF BY PCR: DETECTED
GI PCR PANEL, STOOL: NORMAL

## 2020-11-06 ENCOUNTER — APPOINTMENT (OUTPATIENT)
Dept: CT IMAGING | Facility: CLINIC | Age: 65
End: 2020-11-06

## 2020-12-03 ENCOUNTER — APPOINTMENT (OUTPATIENT)
Dept: GASTROENTEROLOGY | Facility: CLINIC | Age: 65
End: 2020-12-03

## 2021-01-05 ENCOUNTER — APPOINTMENT (OUTPATIENT)
Dept: GASTROENTEROLOGY | Facility: CLINIC | Age: 66
End: 2021-01-05
Payer: COMMERCIAL

## 2021-01-05 VITALS
HEIGHT: 73.5 IN | WEIGHT: 171 LBS | BODY MASS INDEX: 22.18 KG/M2 | DIASTOLIC BLOOD PRESSURE: 86 MMHG | HEART RATE: 56 BPM | SYSTOLIC BLOOD PRESSURE: 177 MMHG

## 2021-01-05 DIAGNOSIS — D50.0 IRON DEFICIENCY ANEMIA SECONDARY TO BLOOD LOSS (CHRONIC): ICD-10-CM

## 2021-01-05 PROCEDURE — 99072 ADDL SUPL MATRL&STAF TM PHE: CPT

## 2021-01-05 PROCEDURE — 99214 OFFICE O/P EST MOD 30 MIN: CPT

## 2021-01-07 DIAGNOSIS — Z01.818 ENCOUNTER FOR OTHER PREPROCEDURAL EXAMINATION: ICD-10-CM

## 2021-01-08 ENCOUNTER — LABORATORY RESULT (OUTPATIENT)
Age: 66
End: 2021-01-08

## 2021-01-08 ENCOUNTER — APPOINTMENT (OUTPATIENT)
Dept: DISASTER EMERGENCY | Facility: CLINIC | Age: 66
End: 2021-01-08

## 2021-01-09 PROBLEM — D50.0 ANEMIA DUE TO BLOOD LOSS, CHRONIC: Status: ACTIVE | Noted: 2021-01-09

## 2021-01-09 NOTE — PHYSICAL EXAM
[General Appearance - Alert] : alert [General Appearance - In No Acute Distress] : in no acute distress [Neck Appearance] : the appearance of the neck was normal [Neck Cervical Mass (___cm)] : no neck mass was observed [Jugular Venous Distention Increased] : there was no jugular-venous distention [Thyroid Diffuse Enlargement] : the thyroid was not enlarged [Thyroid Nodule] : there were no palpable thyroid nodules [Auscultation Breath Sounds / Voice Sounds] : lungs were clear to auscultation bilaterally [Heart Rate And Rhythm] : heart rate was normal and rhythm regular [Heart Sounds] : normal S1 and S2 [Heart Sounds Gallop] : no gallops [Murmurs] : no murmurs [Heart Sounds Pericardial Friction Rub] : no pericardial rub [] : no hepato-splenomegaly [Abdomen Mass (___ Cm)] : no abdominal mass palpated [FreeTextEntry1] : softly distended with diffuse tenderness [Abnormal Walk] : normal gait [Nail Clubbing] : no clubbing  or cyanosis of the fingernails [Musculoskeletal - Swelling] : no joint swelling seen [Motor Tone] : muscle strength and tone were normal [Oriented To Time, Place, And Person] : oriented to person, place, and time [Impaired Insight] : insight and judgment were intact [Affect] : the affect was normal

## 2021-01-09 NOTE — ASSESSMENT
[FreeTextEntry1] : 66yo male with hx colon cancer s/p resection with persistent anemia\par \par will check egd r/o PUD\par also will check colonoscopy as 6 months post-op r/o anastamotic stricture\par Risks and benefits of procedure(s) discussed with patient in detail, including but not limited to, perforation, bleeding, reaction to anesthesia, missed lesions.\par

## 2021-01-09 NOTE — HISTORY OF PRESENT ILLNESS
[de-identified] : 66yo male with anemia\par \par He has hx of colon cancer s/p resection 6 months ago. He had recent cdiff and feels better overall. \par He has been increasing his activity level and feeling stronger overall.  However, he has chronic persistent anemia and Dr Cerda wanted to consider GI evaluation

## 2021-01-12 ENCOUNTER — APPOINTMENT (OUTPATIENT)
Dept: GASTROENTEROLOGY | Facility: AMBULATORY MEDICAL SERVICES | Age: 66
End: 2021-01-12
Payer: COMMERCIAL

## 2021-01-12 ENCOUNTER — RESULT REVIEW (OUTPATIENT)
Age: 66
End: 2021-01-12

## 2021-01-12 DIAGNOSIS — K29.70 GASTRITIS, UNSPECIFIED, W/OUT BLEEDING: ICD-10-CM

## 2021-01-12 PROCEDURE — 43239 EGD BIOPSY SINGLE/MULTIPLE: CPT

## 2021-01-12 PROCEDURE — 45378 DIAGNOSTIC COLONOSCOPY: CPT

## 2021-02-25 ENCOUNTER — APPOINTMENT (OUTPATIENT)
Dept: GASTROENTEROLOGY | Facility: CLINIC | Age: 66
End: 2021-02-25
Payer: COMMERCIAL

## 2021-02-25 VITALS
WEIGHT: 175 LBS | DIASTOLIC BLOOD PRESSURE: 97 MMHG | BODY MASS INDEX: 23.19 KG/M2 | TEMPERATURE: 98 F | HEIGHT: 73 IN | SYSTOLIC BLOOD PRESSURE: 162 MMHG | HEART RATE: 65 BPM

## 2021-02-25 DIAGNOSIS — Z78.9 OTHER SPECIFIED HEALTH STATUS: ICD-10-CM

## 2021-02-25 DIAGNOSIS — R53.81 OTHER MALAISE: ICD-10-CM

## 2021-02-25 DIAGNOSIS — R53.83 OTHER MALAISE: ICD-10-CM

## 2021-02-25 DIAGNOSIS — Z09 ENCOUNTER FOR FOLLOW-UP EXAMINATION AFTER COMPLETED TREATMENT FOR CONDITIONS OTHER THAN MALIGNANT NEOPLASM: ICD-10-CM

## 2021-02-25 PROCEDURE — 99214 OFFICE O/P EST MOD 30 MIN: CPT

## 2021-02-25 PROCEDURE — 99072 ADDL SUPL MATRL&STAF TM PHE: CPT

## 2021-02-26 LAB
ALBUMIN MFR SERPL ELPH: 54.9 %
ALBUMIN SERPL ELPH-MCNC: 4.2 G/DL
ALBUMIN SERPL-MCNC: 4 G/DL
ALBUMIN/GLOB SERPL: 1.2 RATIO
ALP BLD-CCNC: 88 U/L
ALPHA1 GLOB MFR SERPL ELPH: 3.4 %
ALPHA1 GLOB SERPL ELPH-MCNC: 0.2 G/DL
ALPHA2 GLOB MFR SERPL ELPH: 9.4 %
ALPHA2 GLOB SERPL ELPH-MCNC: 0.7 G/DL
ALT SERPL-CCNC: 19 U/L
ANION GAP SERPL CALC-SCNC: 13 MMOL/L
AST SERPL-CCNC: 28 U/L
B-GLOBULIN MFR SERPL ELPH: 14.2 %
B-GLOBULIN SERPL ELPH-MCNC: 1 G/DL
BASOPHILS # BLD AUTO: 0.06 K/UL
BASOPHILS NFR BLD AUTO: 1 %
BILIRUB SERPL-MCNC: 0.2 MG/DL
BUN SERPL-MCNC: 16 MG/DL
CALCIUM SERPL-MCNC: 9.5 MG/DL
CEA SERPL-MCNC: 3 NG/ML
CHLORIDE SERPL-SCNC: 104 MMOL/L
CO2 SERPL-SCNC: 25 MMOL/L
CREAT SERPL-MCNC: 0.96 MG/DL
CRP SERPL-MCNC: 0.11 MG/DL
DEPRECATED KAPPA LC FREE/LAMBDA SER: 1.58 RATIO
EOSINOPHIL # BLD AUTO: 0.09 K/UL
EOSINOPHIL NFR BLD AUTO: 1.5 %
ERYTHROCYTE [SEDIMENTATION RATE] IN BLOOD BY WESTERGREN METHOD: 29 MM/HR
FERRITIN SERPL-MCNC: 16 NG/ML
FOLATE SERPL-MCNC: 17 NG/ML
GAMMA GLOB FLD ELPH-MCNC: 1.3 G/DL
GAMMA GLOB MFR SERPL ELPH: 18.1 %
GLUCOSE SERPL-MCNC: 94 MG/DL
HCT VFR BLD CALC: 41.6 %
HGB BLD-MCNC: 13.1 G/DL
IGA SER QL IEP: 478 MG/DL
IGG SER QL IEP: 1213 MG/DL
IGM SER QL IEP: 96 MG/DL
IMM GRANULOCYTES NFR BLD AUTO: 0.2 %
INTERPRETATION SERPL IEP-IMP: NORMAL
IRON SATN MFR SERPL: 11 %
IRON SERPL-MCNC: 36 UG/DL
KAPPA LC CSF-MCNC: 1.77 MG/DL
KAPPA LC SERPL-MCNC: 2.79 MG/DL
LYMPHOCYTES # BLD AUTO: 1.72 K/UL
LYMPHOCYTES NFR BLD AUTO: 28.6 %
M PROTEIN SPEC IFE-MCNC: NORMAL
MAN DIFF?: NORMAL
MCHC RBC-ENTMCNC: 28.8 PG
MCHC RBC-ENTMCNC: 31.5 GM/DL
MCV RBC AUTO: 91.4 FL
MONOCYTES # BLD AUTO: 0.53 K/UL
MONOCYTES NFR BLD AUTO: 8.8 %
NEUTROPHILS # BLD AUTO: 3.6 K/UL
NEUTROPHILS NFR BLD AUTO: 59.9 %
PLATELET # BLD AUTO: 261 K/UL
POTASSIUM SERPL-SCNC: 4.7 MMOL/L
PROT SERPL-MCNC: 7.2 G/DL
PROT SERPL-MCNC: 7.2 G/DL
PROT SERPL-MCNC: 7.4 G/DL
RBC # BLD: 4.55 M/UL
RBC # FLD: 14.1 %
SODIUM SERPL-SCNC: 142 MMOL/L
TIBC SERPL-MCNC: 338 UG/DL
TSH SERPL-ACNC: 1.23 UIU/ML
UIBC SERPL-MCNC: 302 UG/DL
VIT B12 SERPL-MCNC: 326 PG/ML
WBC # FLD AUTO: 6.01 K/UL

## 2021-03-02 PROBLEM — Z78.9 SOCIAL ALCOHOL USE: Status: ACTIVE | Noted: 2021-02-25

## 2021-03-02 PROBLEM — Z09 FOLLOW UP: Status: ACTIVE | Noted: 2021-02-25

## 2021-03-02 PROBLEM — Z78.9 CAFFEINE USE: Status: ACTIVE | Noted: 2021-02-25

## 2021-03-02 NOTE — HISTORY OF PRESENT ILLNESS
[de-identified] : 66yo male with hx colon cancer with fatigue\par He is otherwise well and increasing his activity. He notes some mild malaise and fatigue without clear reasons. He has been on iron again recently but had d/c

## 2021-03-02 NOTE — ASSESSMENT
[FreeTextEntry1] : 66yo male with mild fatigue\par will check labs to see if any clear cause for anemia\par continue current meds for now

## 2021-11-14 ENCOUNTER — FORM ENCOUNTER (OUTPATIENT)
Age: 66
End: 2021-11-14

## 2022-02-16 NOTE — ED ADULT TRIAGE NOTE - WEIGHT METHOD
Prior to immunization administration, verified patients identity using patient s name and date of birth. Please see Immunization Activity for additional information.     Screening Questionnaire for Pediatric Immunization    Is the child sick today?   No   Does the child have allergies to medications, food, a vaccine component, or latex?   No   Has the child had a serious reaction to a vaccine in the past?   No   Does the child have a long-term health problem with lung, heart, kidney or metabolic disease (e.g., diabetes), asthma, a blood disorder, no spleen, complement component deficiency, a cochlear implant, or a spinal fluid leak?  Is he/she on long-term aspirin therapy?   No   If the child to be vaccinated is 2 through 4 years of age, has a healthcare provider told you that the child had wheezing or asthma in the  past 12 months?   No   If your child is a baby, have you ever been told he or she has had intussusception?   No   Has the child, sibling or parent had a seizure, has the child had brain or other nervous system problems?   No   Does the child have cancer, leukemia, AIDS, or any immune system         problem?   No   Does the child have a parent, brother, or sister with an immune system problem?   No   In the past 3 months, has the child taken medications that affect the immune system such as prednisone, other steroids, or anticancer drugs; drugs for the treatment of rheumatoid arthritis, Crohn s disease, or psoriasis; or had radiation treatments?   No   In the past year, has the child received a transfusion of blood or blood products, or been given immune (gamma) globulin or an antiviral drug?   No   Is the child/teen pregnant or is there a chance that she could become       pregnant during the next month?   No   Has the child received any vaccinations in the past 4 weeks?   No      Immunization questionnaire answers were all negative.        [place] given to patient.    Per  orders of Dr. Fajardo, injection of fluzone, quadracel and proquad given by Aydee Mascorro. Patient instructed to remain in clinic for 15 minutes afterwards, and to report any adverse reaction to me immediately.    Screening performed by Aydee Mascorro on 2/16/2022 at 10:12 AM.     stated

## 2022-03-07 ENCOUNTER — NON-APPOINTMENT (OUTPATIENT)
Age: 67
End: 2022-03-07

## 2022-03-11 ENCOUNTER — NON-APPOINTMENT (OUTPATIENT)
Age: 67
End: 2022-03-11

## 2022-03-11 ENCOUNTER — APPOINTMENT (OUTPATIENT)
Dept: DERMATOLOGY | Facility: CLINIC | Age: 67
End: 2022-03-11
Payer: COMMERCIAL

## 2022-03-11 DIAGNOSIS — L81.4 OTHER MELANIN HYPERPIGMENTATION: ICD-10-CM

## 2022-03-11 DIAGNOSIS — D22.5 MELANOCYTIC NEVI OF TRUNK: ICD-10-CM

## 2022-03-11 DIAGNOSIS — D48.5 NEOPLASM OF UNCERTAIN BEHAVIOR OF SKIN: ICD-10-CM

## 2022-03-11 PROCEDURE — 99203 OFFICE O/P NEW LOW 30 MIN: CPT | Mod: 25

## 2022-03-11 PROCEDURE — 11102 TANGNTL BX SKIN SINGLE LES: CPT

## 2022-03-11 PROCEDURE — 99072 ADDL SUPL MATRL&STAF TM PHE: CPT

## 2022-03-11 NOTE — PHYSICAL EXAM
[Full Body Skin Exam Performed] : performed [FreeTextEntry3] : Skin examination performed of the face, neck, trunk, arms, legs; \par The patient is well, alert and oriented, pleasant and cooperative.\par Eyelids, conjunctivae, oral mucosa, digits and nails all normal.  \par No cervical adenopathy.\par \par Normal findings include:\par \par Seborrheic keratoses- multiple on trunk, largest on R flank\par Angiomas\par + lentigines and solar damage are present in sun exposed areas; \par \par pigmented lesion with irregular color and border; R posterior neck; \par \par

## 2022-03-11 NOTE — HISTORY OF PRESENT ILLNESS
[de-identified] : Pt. presents for skin check;\par c/o few spots of concern;  \par Severity:  mild  \par Modifying factors:  none\par Associated symptoms:  none\par Context:  no association with activity\par

## 2022-03-11 NOTE — ASSESSMENT
[FreeTextEntry1] : Therapeutic options and their risks and benefits; along with multiple diagnostic possibilities were discussed at length; risks and benefits of further study were discussed;\par \par The patient was instructed to check portal and/or call the office in one week for biopsy results.\par \par The patient has a history of significant sun exposure.  Continue annual exams.

## 2022-03-23 ENCOUNTER — TRANSCRIPTION ENCOUNTER (OUTPATIENT)
Age: 67
End: 2022-03-23

## 2022-03-23 LAB — CORE LAB BIOPSY: NORMAL

## 2022-12-21 ENCOUNTER — NON-APPOINTMENT (OUTPATIENT)
Age: 67
End: 2022-12-21

## 2022-12-29 ENCOUNTER — RESULT CHARGE (OUTPATIENT)
Age: 67
End: 2022-12-29

## 2022-12-30 ENCOUNTER — APPOINTMENT (OUTPATIENT)
Dept: FAMILY MEDICINE | Facility: CLINIC | Age: 67
End: 2022-12-30
Payer: COMMERCIAL

## 2022-12-30 ENCOUNTER — NON-APPOINTMENT (OUTPATIENT)
Age: 67
End: 2022-12-30

## 2022-12-30 VITALS
OXYGEN SATURATION: 96 % | HEART RATE: 68 BPM | TEMPERATURE: 97 F | HEIGHT: 73 IN | WEIGHT: 181 LBS | SYSTOLIC BLOOD PRESSURE: 138 MMHG | DIASTOLIC BLOOD PRESSURE: 74 MMHG | BODY MASS INDEX: 23.99 KG/M2

## 2022-12-30 DIAGNOSIS — Z82.49 FAMILY HISTORY OF ISCHEMIC HEART DISEASE AND OTHER DISEASES OF THE CIRCULATORY SYSTEM: ICD-10-CM

## 2022-12-30 DIAGNOSIS — Z85.828 PERSONAL HISTORY OF OTHER MALIGNANT NEOPLASM OF SKIN: ICD-10-CM

## 2022-12-30 DIAGNOSIS — Z01.818 ENCOUNTER FOR OTHER PREPROCEDURAL EXAMINATION: ICD-10-CM

## 2022-12-30 DIAGNOSIS — Z80.3 FAMILY HISTORY OF MALIGNANT NEOPLASM OF BREAST: ICD-10-CM

## 2022-12-30 PROCEDURE — 99213 OFFICE O/P EST LOW 20 MIN: CPT | Mod: 25

## 2022-12-30 PROCEDURE — 93000 ELECTROCARDIOGRAM COMPLETE: CPT

## 2022-12-30 PROCEDURE — 36415 COLL VENOUS BLD VENIPUNCTURE: CPT

## 2022-12-30 RX ORDER — OMEPRAZOLE 40 MG/1
40 CAPSULE, DELAYED RELEASE ORAL DAILY
Qty: 30 | Refills: 5 | Status: DISCONTINUED | COMMUNITY
Start: 2021-01-12 | End: 2022-12-30

## 2022-12-30 RX ORDER — VANCOMYCIN HYDROCHLORIDE 125 MG/1
125 CAPSULE ORAL 4 TIMES DAILY
Qty: 120 | Refills: 1 | Status: DISCONTINUED | COMMUNITY
Start: 2020-11-05 | End: 2022-12-30

## 2022-12-31 LAB
BASOPHILS # BLD AUTO: 0.04 K/UL
BASOPHILS NFR BLD AUTO: 0.9 %
EOSINOPHIL # BLD AUTO: 0.46 K/UL
EOSINOPHIL NFR BLD AUTO: 9.9 %
HCT VFR BLD CALC: 43.8 %
HGB BLD-MCNC: 13.9 G/DL
IMM GRANULOCYTES NFR BLD AUTO: 0.2 %
LYMPHOCYTES # BLD AUTO: 1.52 K/UL
LYMPHOCYTES NFR BLD AUTO: 32.6 %
MAN DIFF?: NORMAL
MCHC RBC-ENTMCNC: 30.4 PG
MCHC RBC-ENTMCNC: 31.7 GM/DL
MCV RBC AUTO: 95.8 FL
MONOCYTES # BLD AUTO: 0.4 K/UL
MONOCYTES NFR BLD AUTO: 8.6 %
NEUTROPHILS # BLD AUTO: 2.23 K/UL
NEUTROPHILS NFR BLD AUTO: 47.8 %
PLATELET # BLD AUTO: 251 K/UL
PSA SERPL-MCNC: 3.57 NG/ML
RBC # BLD: 4.57 M/UL
RBC # FLD: 13.9 %
WBC # FLD AUTO: 4.66 K/UL

## 2022-12-31 NOTE — ASSESSMENT
[FreeTextEntry4] : Patient is a 65yo male with PMH colon cancer, anemia, C. diff colitis who presents to the office for presurgical clearance. Pt is scheduled for colonoscopy on 01/09/2023 with Dr. Houser at their office. Pt requires CBC and EKG prior to procedure. \par \par Pt had b/w done with oncology, Dr. Santillan, on 12/2/22, Hgb 13.6 at that time.\par Labs drawn in office.\par Pt overdue for CPE, PSA added to b/w as well.\par EKG performed in office NSR, LAFB, no acute ST/T changes, no arrhythmias.  No significant change from prior in old records from 2018.  No cardiac complaints.\par \par Clearance pending CBC result.

## 2022-12-31 NOTE — PHYSICAL EXAM
[No Edema] : there was no peripheral edema [Normal] : no rash [Coordination Grossly Intact] : coordination grossly intact [No Focal Deficits] : no focal deficits [Normal Gait] : normal gait [Normal Affect] : the affect was normal [Normal Insight/Judgement] : insight and judgment were intact [de-identified] : old surgical scar s/p colon resection

## 2022-12-31 NOTE — HISTORY OF PRESENT ILLNESS
[No Pertinent Cardiac History] : no history of aortic stenosis, atrial fibrillation, coronary artery disease, recent myocardial infarction, or implantable device/pacemaker [No Pertinent Pulmonary History] : no history of asthma, COPD, sleep apnea, or smoking [No Adverse Anesthesia Reaction] : no adverse anesthesia reaction in self or family member [(Patient denies any chest pain, claudication, dyspnea on exertion, orthopnea, palpitations or syncope)] : Patient denies any chest pain, claudication, dyspnea on exertion, orthopnea, palpitations or syncope [Chronic Anticoagulation] : no chronic anticoagulation [Chronic Kidney Disease] : no chronic kidney disease [Diabetes] : no diabetes [FreeTextEntry1] : Colonoscopy [FreeTextEntry2] : 01/09/2023 [FreeTextEntry3] : Dr. Houser [FreeTextEntry4] : Patient is a 67yo male with PMH colon cancer, anemia, C. diff colitis who presents to the office for presurgical clearance.  Pt is scheduled for colonoscopy on 01/09/2023 with Dr. Houser at their office.  Pt requires CBC and EKG prior to procedure.  Hgb 13.6 with Dr. Santillan on 12/2/22.

## 2022-12-31 NOTE — ADDENDUM
[FreeTextEntry1] : CBC WNL, Hgb stable and WNL.\par Pt optimized for proposed procedure.\par \par Agree with above. Patient is medically optimized for the proposed procedure.\par Kofi Land M.D.\par

## 2023-01-05 ENCOUNTER — LABORATORY RESULT (OUTPATIENT)
Age: 68
End: 2023-01-05

## 2023-01-09 ENCOUNTER — NON-APPOINTMENT (OUTPATIENT)
Age: 68
End: 2023-01-09

## 2023-01-25 ENCOUNTER — APPOINTMENT (OUTPATIENT)
Dept: GASTROENTEROLOGY | Facility: AMBULATORY MEDICAL SERVICES | Age: 68
End: 2023-01-25
Payer: COMMERCIAL

## 2023-01-25 PROCEDURE — 45378 DIAGNOSTIC COLONOSCOPY: CPT | Mod: GC

## 2023-04-07 ENCOUNTER — APPOINTMENT (OUTPATIENT)
Dept: FAMILY MEDICINE | Facility: CLINIC | Age: 68
End: 2023-04-07
Payer: COMMERCIAL

## 2023-04-07 VITALS
HEART RATE: 64 BPM | SYSTOLIC BLOOD PRESSURE: 130 MMHG | BODY MASS INDEX: 23.86 KG/M2 | TEMPERATURE: 96.9 F | OXYGEN SATURATION: 99 % | WEIGHT: 180 LBS | DIASTOLIC BLOOD PRESSURE: 70 MMHG | HEIGHT: 73 IN

## 2023-04-07 DIAGNOSIS — Z00.00 ENCOUNTER FOR GENERAL ADULT MEDICAL EXAMINATION W/OUT ABNORMAL FINDINGS: ICD-10-CM

## 2023-04-07 DIAGNOSIS — Z85.038 PERSONAL HISTORY OF OTHER MALIGNANT NEOPLASM OF LARGE INTESTINE: ICD-10-CM

## 2023-04-07 PROCEDURE — 36415 COLL VENOUS BLD VENIPUNCTURE: CPT

## 2023-04-07 PROCEDURE — 99397 PER PM REEVAL EST PAT 65+ YR: CPT | Mod: 25

## 2023-04-07 NOTE — ASSESSMENT
[FreeTextEntry1] : Patient is a 68yo male with PMH colon cancer s/p right hemicolectomy in 06/2020, anemia, C. diff colitis who presents to the office for CPE. \par \par Health Maintenance\par - Due for Shingrix, will consider in near future.\par - Due for PNA, declines today.\par - Fasting labs drawn in office.  Will trend PSA.\par - Declines EKG, had EKG in 12/2022 which was stable from priors, has no cardiac complaints.\par - Eat plenty of fruits and vegetables, especially deeply colored fruits/vegetables (such as leafy greens, peaches) that are more nutrient-dense.  Continue to work hard on diet and exercise, limiting/avoiding saturated fat, fatty foods, greasy foods, red meats, white flour-based carbohydrates (cookies, cakes, white bread, white rice), and added sugars.  Chose whole grain foods and products made with whole grains over refined grains and white flour-based carbohydrates.  Avoid beverages and food with added sugar.  Limit salt intake to improve blood pressure.  Limit alcohol intake.\par - Try and incorporate 30 minutes of aerobic exercise to your daily routine.\par \par Call the office or go to the ED immediately if you develop new, worsening or concerning symptoms including high fever, severe headache/worst headache of your life, confusion, dizziness/lightheadedness, loss of consciousness, severe chest pain, difficulty breathing, shortness of breath, severe abdominal pain, excessive vomiting/diarrhea, inability to feel/move the extremities, or any other concerning symptoms.\par

## 2023-04-07 NOTE — HISTORY OF PRESENT ILLNESS
[FreeTextEntry1] : CPE. [de-identified] : Patient is a 66yo male with PMH colon cancer s/p right hemicolectomy in 06/2020, anemia, C. diff colitis who presents to the office for CPE. \par  \par Last CPE:  08/26/2020, Dr. SREEKANTH Land. \par Colonoscopy:  01/25/2023, Dr. Houser; previous surgery (ileo-colonic anastamosis) in colon, internal hemorrhoids; repeat 1 year. \par Ophthalmology:  wears readers.\par Dermatology:  Dr. David 03/2022. \par Dentist:  MICHELLE.\par PSA:  3.57 in 01/2023, prior in 08/2020 was 1.0.  Will repeat PSA today to trend, +/- Urology consult. \par Shingles:  due, recommended today, will schedule in near future.\par Flu:  fall 2022.\par Tdap:  05/17/2018. \par PNA:  declines.\par COVID:  x4.\par \par Oncology:  Dr. Santillan.

## 2023-04-07 NOTE — HEALTH RISK ASSESSMENT
[Yes] : Yes [2 - 3 times a week (3 pts)] : 2 - 3  times a week (3 points) [1 or 2 (0 pts)] : 1 or 2 (0 points) [Never (0 pts)] : Never (0 points) [No] : In the past 12 months have you used drugs other than those required for medical reasons? No [No falls in past year] : Patient reported no falls in the past year [0] : 2) Feeling down, depressed, or hopeless: Not at all (0) [PHQ-2 Negative - No further assessment needed] : PHQ-2 Negative - No further assessment needed [HIV test declined] : HIV test declined [Hepatitis C test declined] : Hepatitis C test declined [None] : None [With Significant Other] : lives with significant other [Employed] : employed [] :  [# Of Children ___] : has [unfilled] children [Sexually Active] : sexually active [Feels Safe at Home] : Feels safe at home [Fully functional (bathing, dressing, toileting, transferring, walking, feeding)] : Fully functional (bathing, dressing, toileting, transferring, walking, feeding) [Fully functional (using the telephone, shopping, preparing meals, housekeeping, doing laundry, using] : Fully functional and needs no help or supervision to perform IADLs (using the telephone, shopping, preparing meals, housekeeping, doing laundry, using transportation, managing medications and managing finances) [Smoke Detector] : smoke detector [Carbon Monoxide Detector] : carbon monoxide detector [Safety elements used in home] : safety elements used in home [Seat Belt] :  uses seat belt [Sunscreen] : uses sunscreen [With Patient/Caregiver] : , with patient/caregiver [Reviewed no changes] : Reviewed, no changes [I will adhere to the patient's wishes.] : I will adhere to the patient's wishes. [Former] : Former [5-9] : 5-9 [> 15 Years] : > 15 Years [Audit-CScore] : 3 [de-identified] : active, walks, elliptical [de-identified] : well balanced [CPU2Cemvo] : 0 [Patient reported colonoscopy was normal] : Patient reported colonoscopy was normal [Change in mental status noted] : No change in mental status noted [Language] : denies difficulty with language [High Risk Behavior] : no high risk behavior [Reports changes in hearing] : Reports no changes in hearing [Reports changes in vision] : Reports no changes in vision [Reports changes in dental health] : Reports no changes in dental health [Travel to Developing Areas] : does not  travel to developing areas [ColonoscopyDate] : 01/2023 [FreeTextEntry2] :  [ColonoscopyComments] : repeat 1 year

## 2023-04-10 ENCOUNTER — TRANSCRIPTION ENCOUNTER (OUTPATIENT)
Age: 68
End: 2023-04-10

## 2023-04-10 DIAGNOSIS — E78.00 PURE HYPERCHOLESTEROLEMIA, UNSPECIFIED: ICD-10-CM

## 2023-04-10 LAB
ALBUMIN SERPL ELPH-MCNC: 4.2 G/DL
ALP BLD-CCNC: 69 U/L
ALT SERPL-CCNC: 19 U/L
ANION GAP SERPL CALC-SCNC: 11 MMOL/L
AST SERPL-CCNC: 23 U/L
BASOPHILS # BLD AUTO: 0.06 K/UL
BASOPHILS NFR BLD AUTO: 1.5 %
BILIRUB SERPL-MCNC: 0.4 MG/DL
BUN SERPL-MCNC: 12 MG/DL
CALCIUM SERPL-MCNC: 9.9 MG/DL
CHLORIDE SERPL-SCNC: 105 MMOL/L
CHOLEST SERPL-MCNC: 211 MG/DL
CO2 SERPL-SCNC: 26 MMOL/L
CREAT SERPL-MCNC: 0.89 MG/DL
EGFR: 93 ML/MIN/1.73M2
EOSINOPHIL # BLD AUTO: 0.2 K/UL
EOSINOPHIL NFR BLD AUTO: 5 %
GLUCOSE SERPL-MCNC: 98 MG/DL
HCT VFR BLD CALC: 44.8 %
HDLC SERPL-MCNC: 90 MG/DL
HGB BLD-MCNC: 14.1 G/DL
IMM GRANULOCYTES NFR BLD AUTO: 0 %
LDLC SERPL CALC-MCNC: 109 MG/DL
LYMPHOCYTES # BLD AUTO: 1.64 K/UL
LYMPHOCYTES NFR BLD AUTO: 41.3 %
MAN DIFF?: NORMAL
MCHC RBC-ENTMCNC: 30.6 PG
MCHC RBC-ENTMCNC: 31.5 GM/DL
MCV RBC AUTO: 97.2 FL
MONOCYTES # BLD AUTO: 0.45 K/UL
MONOCYTES NFR BLD AUTO: 11.3 %
NEUTROPHILS # BLD AUTO: 1.62 K/UL
NEUTROPHILS NFR BLD AUTO: 40.9 %
NONHDLC SERPL-MCNC: 120 MG/DL
PLATELET # BLD AUTO: 230 K/UL
POTASSIUM SERPL-SCNC: 4.6 MMOL/L
PROT SERPL-MCNC: 6.9 G/DL
PSA SERPL-MCNC: 2.16 NG/ML
RBC # BLD: 4.61 M/UL
RBC # FLD: 14.2 %
SODIUM SERPL-SCNC: 142 MMOL/L
TRIGL SERPL-MCNC: 57 MG/DL
TSH SERPL-ACNC: 1.28 UIU/ML
WBC # FLD AUTO: 3.97 K/UL

## 2023-07-14 ENCOUNTER — APPOINTMENT (OUTPATIENT)
Dept: FAMILY MEDICINE | Facility: CLINIC | Age: 68
End: 2023-07-14
Payer: COMMERCIAL

## 2023-07-14 VITALS
SYSTOLIC BLOOD PRESSURE: 130 MMHG | OXYGEN SATURATION: 98 % | DIASTOLIC BLOOD PRESSURE: 82 MMHG | WEIGHT: 181 LBS | HEIGHT: 73 IN | TEMPERATURE: 96.5 F | HEART RATE: 59 BPM | BODY MASS INDEX: 23.99 KG/M2

## 2023-07-14 DIAGNOSIS — R97.20 ELEVATED PROSTATE, SPECIFIC ANTIGEN [PSA]: ICD-10-CM

## 2023-07-14 DIAGNOSIS — E78.5 HYPERLIPIDEMIA, UNSPECIFIED: ICD-10-CM

## 2023-07-14 PROCEDURE — 99213 OFFICE O/P EST LOW 20 MIN: CPT | Mod: 25

## 2023-07-14 PROCEDURE — 36415 COLL VENOUS BLD VENIPUNCTURE: CPT

## 2023-07-14 NOTE — HEALTH RISK ASSESSMENT
[Yes] : Yes [2 - 3 times a week (3 pts)] : 2 - 3  times a week (3 points) [1 or 2 (0 pts)] : 1 or 2 (0 points) [Never (0 pts)] : Never (0 points) [No] : In the past 12 months have you used drugs other than those required for medical reasons? No [No falls in past year] : Patient reported no falls in the past year [0] : 2) Feeling down, depressed, or hopeless: Not at all (0) [PHQ-2 Negative - No further assessment needed] : PHQ-2 Negative - No further assessment needed [Former] : Former [5-9] : 5-9 [> 15 Years] : > 15 Years [Audit-CScore] : 3 [de-identified] : walks, elliptical [de-identified] : well balanced [[id number]] : 0

## 2023-07-14 NOTE — HISTORY OF PRESENT ILLNESS
[FreeTextEntry1] : Follow up HLD, borderline PSA. [de-identified] : Patient is a 66yo male with PMH colon cancer s/p right hemicolectomy in 06/2020, anemia, C. diff colitis, HLD who presents to the office for follow up. \par \par HLD:  , HDL 90, ASCVD risk 13.61% in 04/2023.  Statin recommended, however pt declined. \par \par PSA down-trending, 3.57 in 12/2022, repeat in 04/2023 was 2.16.  Will repeat today.  Denies urinary complaints.

## 2023-07-14 NOTE — ASSESSMENT
[FreeTextEntry1] : Patient is a 68yo male with PMH colon cancer s/p right hemicolectomy in 06/2020, anemia, C. diff colitis, HLD who presents to the office for follow up. \par  \par HLD\par - Fasting labs drawn in office.\par - Declined statin in the past despite elevated ASCVD risk.\par - Limit/avoid greasy foods, fried foods, fatty foods, and saturated fat in your diet and try and eat more lean proteins.\par - Try and incorporate 30 minutes of aerobic exercise to your daily routine.\par \par Rising PSA\par - Pt with borderline elevated PSA, down-trending.  Pt asymptomatic.  Will repeat today.\par \par Call the office or go to the ED immediately if you develop new, worsening or concerning symptoms including high fever, severe headache/worst headache of your life, confusion, dizziness/lightheadedness, loss of consciousness, severe chest pain, difficulty breathing, shortness of breath, severe abdominal pain, excessive vomiting/diarrhea, inability to feel/move the extremities, or any other concerning symptoms.\par

## 2023-07-17 LAB
ALBUMIN SERPL ELPH-MCNC: 4.3 G/DL
ALP BLD-CCNC: 68 U/L
ALT SERPL-CCNC: 17 U/L
ANION GAP SERPL CALC-SCNC: 9 MMOL/L
AST SERPL-CCNC: 23 U/L
BILIRUB SERPL-MCNC: 0.4 MG/DL
BUN SERPL-MCNC: 15 MG/DL
CALCIUM SERPL-MCNC: 9.7 MG/DL
CHLORIDE SERPL-SCNC: 105 MMOL/L
CHOLEST SERPL-MCNC: 207 MG/DL
CO2 SERPL-SCNC: 27 MMOL/L
CREAT SERPL-MCNC: 1.02 MG/DL
EGFR: 80 ML/MIN/1.73M2
GLUCOSE SERPL-MCNC: 96 MG/DL
HDLC SERPL-MCNC: 86 MG/DL
LDLC SERPL CALC-MCNC: 113 MG/DL
NONHDLC SERPL-MCNC: 121 MG/DL
POTASSIUM SERPL-SCNC: 5.3 MMOL/L
PROT SERPL-MCNC: 6.8 G/DL
PSA SERPL-MCNC: 2.09 NG/ML
SODIUM SERPL-SCNC: 141 MMOL/L
TRIGL SERPL-MCNC: 44 MG/DL

## 2023-07-25 ENCOUNTER — TRANSCRIPTION ENCOUNTER (OUTPATIENT)
Age: 68
End: 2023-07-25

## 2023-07-27 ENCOUNTER — TRANSCRIPTION ENCOUNTER (OUTPATIENT)
Age: 68
End: 2023-07-27

## 2024-03-06 ENCOUNTER — APPOINTMENT (OUTPATIENT)
Dept: GASTROENTEROLOGY | Facility: AMBULATORY MEDICAL SERVICES | Age: 69
End: 2024-03-06
Payer: COMMERCIAL

## 2024-03-06 PROCEDURE — 45378 DIAGNOSTIC COLONOSCOPY: CPT | Mod: 33

## 2024-06-06 ENCOUNTER — NON-APPOINTMENT (OUTPATIENT)
Age: 69
End: 2024-06-06

## 2024-07-05 ENCOUNTER — NON-APPOINTMENT (OUTPATIENT)
Age: 69
End: 2024-07-05

## 2024-10-29 NOTE — ED STATDOCS - NORMAL, MLM
Encounter addended by: Abe Hussein MD on: 10/29/2024 12:25 PM   Actions taken: Charge Capture section accepted terell all pertinent systems normal

## 2025-01-20 ENCOUNTER — RESULT REVIEW (OUTPATIENT)
Age: 70
End: 2025-01-20

## 2025-01-20 ENCOUNTER — APPOINTMENT (OUTPATIENT)
Dept: GASTROENTEROLOGY | Facility: AMBULATORY MEDICAL SERVICES | Age: 70
End: 2025-01-20
Payer: COMMERCIAL

## 2025-01-20 PROCEDURE — 43239 EGD BIOPSY SINGLE/MULTIPLE: CPT

## 2025-01-20 PROCEDURE — 45378 DIAGNOSTIC COLONOSCOPY: CPT

## 2025-02-13 ENCOUNTER — APPOINTMENT (OUTPATIENT)
Dept: GASTROENTEROLOGY | Facility: CLINIC | Age: 70
End: 2025-02-13
Payer: COMMERCIAL

## 2025-02-13 PROCEDURE — 91110 GI TRC IMG INTRAL ESOPH-ILE: CPT

## 2025-02-21 ENCOUNTER — RESULT REVIEW (OUTPATIENT)
Age: 70
End: 2025-02-21

## 2025-02-22 ENCOUNTER — OUTPATIENT (OUTPATIENT)
Dept: OUTPATIENT SERVICES | Facility: HOSPITAL | Age: 70
LOS: 1 days | End: 2025-02-22
Payer: COMMERCIAL

## 2025-02-22 ENCOUNTER — APPOINTMENT (OUTPATIENT)
Dept: RADIOLOGY | Facility: CLINIC | Age: 70
End: 2025-02-22
Payer: COMMERCIAL

## 2025-02-22 DIAGNOSIS — Z09 ENCOUNTER FOR FOLLOW-UP EXAMINATION AFTER COMPLETED TREATMENT FOR CONDITIONS OTHER THAN MALIGNANT NEOPLASM: ICD-10-CM

## 2025-02-22 PROCEDURE — 74018 RADEX ABDOMEN 1 VIEW: CPT | Mod: 26

## 2025-02-22 PROCEDURE — 74018 RADEX ABDOMEN 1 VIEW: CPT

## 2025-03-14 ENCOUNTER — APPOINTMENT (OUTPATIENT)
Dept: GASTROENTEROLOGY | Facility: CLINIC | Age: 70
End: 2025-03-14
Payer: COMMERCIAL

## 2025-03-14 ENCOUNTER — NON-APPOINTMENT (OUTPATIENT)
Age: 70
End: 2025-03-14

## 2025-03-14 DIAGNOSIS — D50.0 IRON DEFICIENCY ANEMIA SECONDARY TO BLOOD LOSS (CHRONIC): ICD-10-CM

## 2025-03-14 LAB
FERRITIN SERPL-MCNC: 48 NG/ML
IRON SATN MFR SERPL: 27 %
IRON SERPL-MCNC: 77 UG/DL
TIBC SERPL-MCNC: 286 UG/DL
UIBC SERPL-MCNC: 209 UG/DL

## 2025-03-14 PROCEDURE — 99213 OFFICE O/P EST LOW 20 MIN: CPT

## 2025-03-19 LAB
BASOPHILS # BLD AUTO: 0.05 K/UL
BASOPHILS NFR BLD AUTO: 0.9 %
EOSINOPHIL # BLD AUTO: 0.14 K/UL
EOSINOPHIL NFR BLD AUTO: 2.6 %
HCT VFR BLD CALC: 43.2 %
HGB BLD-MCNC: 13.9 G/DL
IMM GRANULOCYTES NFR BLD AUTO: 0.4 %
LYMPHOCYTES # BLD AUTO: 1.81 K/UL
LYMPHOCYTES NFR BLD AUTO: 33.3 %
MAN DIFF?: NORMAL
MCHC RBC-ENTMCNC: 30.7 PG
MCHC RBC-ENTMCNC: 32.2 G/DL
MCV RBC AUTO: 95.4 FL
MONOCYTES # BLD AUTO: 0.57 K/UL
MONOCYTES NFR BLD AUTO: 10.5 %
NEUTROPHILS # BLD AUTO: 2.84 K/UL
NEUTROPHILS NFR BLD AUTO: 52.3 %
PLATELET # BLD AUTO: 232 K/UL
RBC # BLD: 4.53 M/UL
RBC # FLD: 13.6 %
WBC # FLD AUTO: 5.43 K/UL

## 2025-04-15 ENCOUNTER — RESULT CHARGE (OUTPATIENT)
Age: 70
End: 2025-04-15

## 2025-04-24 ENCOUNTER — NON-APPOINTMENT (OUTPATIENT)
Age: 70
End: 2025-04-24

## 2025-04-25 ENCOUNTER — NON-APPOINTMENT (OUTPATIENT)
Age: 70
End: 2025-04-25

## 2025-04-25 ENCOUNTER — APPOINTMENT (OUTPATIENT)
Dept: FAMILY MEDICINE | Facility: CLINIC | Age: 70
End: 2025-04-25

## 2025-04-25 VITALS
HEIGHT: 73 IN | DIASTOLIC BLOOD PRESSURE: 80 MMHG | HEART RATE: 90 BPM | BODY MASS INDEX: 24.65 KG/M2 | OXYGEN SATURATION: 96 % | WEIGHT: 186 LBS | SYSTOLIC BLOOD PRESSURE: 138 MMHG | TEMPERATURE: 98.2 F

## 2025-04-25 DIAGNOSIS — Z23 ENCOUNTER FOR IMMUNIZATION: ICD-10-CM

## 2025-04-25 DIAGNOSIS — Z00.00 ENCOUNTER FOR GENERAL ADULT MEDICAL EXAMINATION W/OUT ABNORMAL FINDINGS: ICD-10-CM

## 2025-04-25 DIAGNOSIS — E78.5 HYPERLIPIDEMIA, UNSPECIFIED: ICD-10-CM

## 2025-04-25 DIAGNOSIS — Z85.038 PERSONAL HISTORY OF OTHER MALIGNANT NEOPLASM OF LARGE INTESTINE: ICD-10-CM

## 2025-04-25 DIAGNOSIS — D50.0 IRON DEFICIENCY ANEMIA SECONDARY TO BLOOD LOSS (CHRONIC): ICD-10-CM

## 2025-04-25 PROCEDURE — 99387 INIT PM E/M NEW PAT 65+ YRS: CPT | Mod: 25

## 2025-04-25 PROCEDURE — 90684 PCV21 VACCINE IM: CPT

## 2025-04-25 PROCEDURE — G0009: CPT

## 2025-04-25 PROCEDURE — 93000 ELECTROCARDIOGRAM COMPLETE: CPT

## 2025-04-25 PROCEDURE — 36415 COLL VENOUS BLD VENIPUNCTURE: CPT

## 2025-04-26 ENCOUNTER — TRANSCRIPTION ENCOUNTER (OUTPATIENT)
Age: 70
End: 2025-04-26

## 2025-04-26 LAB
ALBUMIN SERPL ELPH-MCNC: 4.3 G/DL
ALP BLD-CCNC: 75 U/L
ALT SERPL-CCNC: 16 U/L
ANION GAP SERPL CALC-SCNC: 15 MMOL/L
AST SERPL-CCNC: 25 U/L
BILIRUB SERPL-MCNC: 0.4 MG/DL
BUN SERPL-MCNC: 14 MG/DL
CALCIUM SERPL-MCNC: 9.8 MG/DL
CHLORIDE SERPL-SCNC: 105 MMOL/L
CHOLEST SERPL-MCNC: 203 MG/DL
CO2 SERPL-SCNC: 23 MMOL/L
CREAT SERPL-MCNC: 0.87 MG/DL
EGFRCR SERPLBLD CKD-EPI 2021: 92 ML/MIN/1.73M2
GLUCOSE SERPL-MCNC: 90 MG/DL
HDLC SERPL-MCNC: 75 MG/DL
LDLC SERPL-MCNC: 119 MG/DL
NONHDLC SERPL-MCNC: 128 MG/DL
POTASSIUM SERPL-SCNC: 4.1 MMOL/L
PROT SERPL-MCNC: 6.9 G/DL
PSA SERPL-MCNC: 1.31 NG/ML
SODIUM SERPL-SCNC: 143 MMOL/L
TRIGL SERPL-MCNC: 47 MG/DL

## 2025-04-28 ENCOUNTER — TRANSCRIPTION ENCOUNTER (OUTPATIENT)
Age: 70
End: 2025-04-28
